# Patient Record
Sex: FEMALE | Race: WHITE | Employment: FULL TIME | URBAN - METROPOLITAN AREA
[De-identification: names, ages, dates, MRNs, and addresses within clinical notes are randomized per-mention and may not be internally consistent; named-entity substitution may affect disease eponyms.]

---

## 2020-02-13 ENCOUNTER — HOSPITAL ENCOUNTER (OUTPATIENT)
Dept: LAB | Age: 45
Discharge: HOME | End: 2020-02-13

## 2020-02-13 ENCOUNTER — HOSPITAL ENCOUNTER (OUTPATIENT)
Dept: PREADMISSION TESTING | Age: 45
Discharge: HOME | End: 2020-02-13
Payer: MEDICAID

## 2020-02-13 LAB
ATRIAL RATE: 83 BPM
CALCULATED P AXIS, ECG09: 53 DEGREES
CALCULATED R AXIS, ECG10: 61 DEGREES
CALCULATED T AXIS, ECG11: 31 DEGREES
DIAGNOSIS, 93000: NORMAL
P-R INTERVAL, ECG05: 116 MS
Q-T INTERVAL, ECG07: 386 MS
QRS DURATION, ECG06: 78 MS
QTC CALCULATION (BEZET), ECG08: 453 MS
VENTRICULAR RATE, ECG03: 83 BPM
XX-LABCORP SPECIMEN COL,LCBCF: NORMAL

## 2020-02-13 PROCEDURE — 99001 SPECIMEN HANDLING PT-LAB: CPT

## 2020-02-13 PROCEDURE — 93005 ELECTROCARDIOGRAM TRACING: CPT

## 2020-02-29 ENCOUNTER — ANESTHESIA EVENT (OUTPATIENT)
Dept: SURGERY | Age: 45
End: 2020-02-29
Payer: MEDICAID

## 2020-03-02 ENCOUNTER — ANESTHESIA (OUTPATIENT)
Dept: SURGERY | Age: 45
End: 2020-03-02
Payer: MEDICAID

## 2020-03-02 ENCOUNTER — HOSPITAL ENCOUNTER (OUTPATIENT)
Age: 45
Setting detail: OUTPATIENT SURGERY
Discharge: HOME | End: 2020-03-02
Attending: ORTHOPAEDIC SURGERY | Admitting: ORTHOPAEDIC SURGERY
Payer: MEDICAID

## 2020-03-02 VITALS
RESPIRATION RATE: 15 BRPM | TEMPERATURE: 97.7 F | SYSTOLIC BLOOD PRESSURE: 109 MMHG | HEIGHT: 66 IN | DIASTOLIC BLOOD PRESSURE: 78 MMHG | WEIGHT: 259.06 LBS | HEART RATE: 97 BPM | OXYGEN SATURATION: 96 % | BODY MASS INDEX: 41.63 KG/M2

## 2020-03-02 PROCEDURE — 76010000131 HC OR TIME 2 TO 2.5 HR: Performed by: ORTHOPAEDIC SURGERY

## 2020-03-02 PROCEDURE — 77030002933 HC SUT MCRYL J&J -A: Performed by: ORTHOPAEDIC SURGERY

## 2020-03-02 PROCEDURE — 74011250637 HC RX REV CODE- 250/637: Performed by: ANESTHESIOLOGY

## 2020-03-02 PROCEDURE — 77030006835 HC BLD SAW SAG STRY -B: Performed by: ORTHOPAEDIC SURGERY

## 2020-03-02 PROCEDURE — 74011250636 HC RX REV CODE- 250/636: Performed by: NURSE ANESTHETIST, CERTIFIED REGISTERED

## 2020-03-02 PROCEDURE — 77030006877 HC BLD SHV FLL RAD S&N -B: Performed by: ORTHOPAEDIC SURGERY

## 2020-03-02 PROCEDURE — 74011000250 HC RX REV CODE- 250: Performed by: NURSE ANESTHETIST, CERTIFIED REGISTERED

## 2020-03-02 PROCEDURE — 77030034531 HC BLD SHV FLL RAD ELITE S&N -B: Performed by: ORTHOPAEDIC SURGERY

## 2020-03-02 PROCEDURE — 74011250636 HC RX REV CODE- 250/636: Performed by: ORTHOPAEDIC SURGERY

## 2020-03-02 PROCEDURE — 77030020782 HC GWN BAIR PAWS FLX 3M -B: Performed by: ORTHOPAEDIC SURGERY

## 2020-03-02 PROCEDURE — 77030032834 HC GRFT BN SUB MUSC -F: Performed by: ORTHOPAEDIC SURGERY

## 2020-03-02 PROCEDURE — 74011250636 HC RX REV CODE- 250/636: Performed by: ANESTHESIOLOGY

## 2020-03-02 PROCEDURE — 76210000021 HC REC RM PH II 0.5 TO 1 HR: Performed by: ORTHOPAEDIC SURGERY

## 2020-03-02 PROCEDURE — 76060000035 HC ANESTHESIA 2 TO 2.5 HR: Performed by: ORTHOPAEDIC SURGERY

## 2020-03-02 PROCEDURE — 74011000250 HC RX REV CODE- 250: Performed by: ORTHOPAEDIC SURGERY

## 2020-03-02 PROCEDURE — C1713 ANCHOR/SCREW BN/BN,TIS/BN: HCPCS | Performed by: ORTHOPAEDIC SURGERY

## 2020-03-02 PROCEDURE — 77030018835 HC SOL IRR LR ICUM -A: Performed by: ORTHOPAEDIC SURGERY

## 2020-03-02 PROCEDURE — L1830 KO IMMOB CANVAS LONG PRE OTS: HCPCS | Performed by: ORTHOPAEDIC SURGERY

## 2020-03-02 PROCEDURE — 77030000032 HC CUF TRNQT ZIMM -B: Performed by: ORTHOPAEDIC SURGERY

## 2020-03-02 PROCEDURE — 77030018725 HC ELECTRD 90DEG DISP J&J -D: Performed by: ORTHOPAEDIC SURGERY

## 2020-03-02 PROCEDURE — 77030022877 HC TU IRR ARTHRO PMP ARTH -B: Performed by: ORTHOPAEDIC SURGERY

## 2020-03-02 PROCEDURE — 77030040361 HC SLV COMPR DVT MDII -B: Performed by: ORTHOPAEDIC SURGERY

## 2020-03-02 PROCEDURE — 76210000006 HC OR PH I REC 0.5 TO 1 HR: Performed by: ORTHOPAEDIC SURGERY

## 2020-03-02 PROCEDURE — 77030034478 HC TU IRR ARTHRO PT ARTH -B: Performed by: ORTHOPAEDIC SURGERY

## 2020-03-02 PROCEDURE — 77030002922 HC SUT FBRWRE ARTH -B: Performed by: ORTHOPAEDIC SURGERY

## 2020-03-02 PROCEDURE — C1769 GUIDE WIRE: HCPCS | Performed by: ORTHOPAEDIC SURGERY

## 2020-03-02 DEVICE — SCR INTRF BIOCOMP ADV 8X23MM -- MILAGRO BIOCRYL RAPIDE: Type: IMPLANTABLE DEVICE | Site: KNEE | Status: FUNCTIONAL

## 2020-03-02 DEVICE — GRAFT BNE SUB SM CANC FRZN MORSELIZED W/ VIABLE CELL: Type: IMPLANTABLE DEVICE | Site: KNEE | Status: FUNCTIONAL

## 2020-03-02 RX ORDER — GLYCOPYRROLATE 0.2 MG/ML
INJECTION INTRAMUSCULAR; INTRAVENOUS AS NEEDED
Status: DISCONTINUED | OUTPATIENT
Start: 2020-03-02 | End: 2020-03-02 | Stop reason: HOSPADM

## 2020-03-02 RX ORDER — HYDROMORPHONE HYDROCHLORIDE 2 MG/ML
0.5 INJECTION, SOLUTION INTRAMUSCULAR; INTRAVENOUS; SUBCUTANEOUS
Status: DISCONTINUED | OUTPATIENT
Start: 2020-03-02 | End: 2020-03-03 | Stop reason: HOSPADM

## 2020-03-02 RX ORDER — NALOXONE HYDROCHLORIDE 0.4 MG/ML
0.2 INJECTION, SOLUTION INTRAMUSCULAR; INTRAVENOUS; SUBCUTANEOUS AS NEEDED
Status: DISCONTINUED | OUTPATIENT
Start: 2020-03-02 | End: 2020-03-03 | Stop reason: HOSPADM

## 2020-03-02 RX ORDER — MIDAZOLAM HYDROCHLORIDE 1 MG/ML
INJECTION, SOLUTION INTRAMUSCULAR; INTRAVENOUS AS NEEDED
Status: DISCONTINUED | OUTPATIENT
Start: 2020-03-02 | End: 2020-03-02 | Stop reason: HOSPADM

## 2020-03-02 RX ORDER — OXYCODONE AND ACETAMINOPHEN 5; 325 MG/1; MG/1
1 TABLET ORAL AS NEEDED
Status: DISCONTINUED | OUTPATIENT
Start: 2020-03-02 | End: 2020-03-03 | Stop reason: HOSPADM

## 2020-03-02 RX ORDER — FENTANYL CITRATE 50 UG/ML
INJECTION, SOLUTION INTRAMUSCULAR; INTRAVENOUS AS NEEDED
Status: DISCONTINUED | OUTPATIENT
Start: 2020-03-02 | End: 2020-03-02 | Stop reason: HOSPADM

## 2020-03-02 RX ORDER — SODIUM CHLORIDE, SODIUM LACTATE, POTASSIUM CHLORIDE, CALCIUM CHLORIDE 600; 310; 30; 20 MG/100ML; MG/100ML; MG/100ML; MG/100ML
1000 INJECTION, SOLUTION INTRAVENOUS CONTINUOUS
Status: DISCONTINUED | OUTPATIENT
Start: 2020-03-02 | End: 2020-03-03 | Stop reason: HOSPADM

## 2020-03-02 RX ORDER — SCOLOPAMINE TRANSDERMAL SYSTEM 1 MG/1
1 PATCH, EXTENDED RELEASE TRANSDERMAL ONCE
Status: DISCONTINUED | OUTPATIENT
Start: 2020-03-02 | End: 2020-03-03 | Stop reason: HOSPADM

## 2020-03-02 RX ORDER — FLUMAZENIL 0.1 MG/ML
0.2 INJECTION INTRAVENOUS
Status: DISCONTINUED | OUTPATIENT
Start: 2020-03-02 | End: 2020-03-03 | Stop reason: HOSPADM

## 2020-03-02 RX ORDER — SODIUM CHLORIDE, SODIUM LACTATE, POTASSIUM CHLORIDE, CALCIUM CHLORIDE 600; 310; 30; 20 MG/100ML; MG/100ML; MG/100ML; MG/100ML
125 INJECTION, SOLUTION INTRAVENOUS CONTINUOUS
Status: DISCONTINUED | OUTPATIENT
Start: 2020-03-02 | End: 2020-03-03 | Stop reason: HOSPADM

## 2020-03-02 RX ORDER — ALBUTEROL SULFATE 0.83 MG/ML
2.5 SOLUTION RESPIRATORY (INHALATION) AS NEEDED
Status: DISCONTINUED | OUTPATIENT
Start: 2020-03-02 | End: 2020-03-03 | Stop reason: HOSPADM

## 2020-03-02 RX ORDER — FENTANYL CITRATE 50 UG/ML
25 INJECTION, SOLUTION INTRAMUSCULAR; INTRAVENOUS AS NEEDED
Status: DISCONTINUED | OUTPATIENT
Start: 2020-03-02 | End: 2020-03-03 | Stop reason: HOSPADM

## 2020-03-02 RX ORDER — DIPHENHYDRAMINE HYDROCHLORIDE 50 MG/ML
12.5 INJECTION, SOLUTION INTRAMUSCULAR; INTRAVENOUS
Status: DISCONTINUED | OUTPATIENT
Start: 2020-03-02 | End: 2020-03-03 | Stop reason: HOSPADM

## 2020-03-02 RX ORDER — PROPOFOL 10 MG/ML
INJECTION, EMULSION INTRAVENOUS AS NEEDED
Status: DISCONTINUED | OUTPATIENT
Start: 2020-03-02 | End: 2020-03-02 | Stop reason: HOSPADM

## 2020-03-02 RX ORDER — HYDROMORPHONE HYDROCHLORIDE 2 MG/ML
INJECTION, SOLUTION INTRAMUSCULAR; INTRAVENOUS; SUBCUTANEOUS AS NEEDED
Status: DISCONTINUED | OUTPATIENT
Start: 2020-03-02 | End: 2020-03-02 | Stop reason: HOSPADM

## 2020-03-02 RX ORDER — DEXAMETHASONE SODIUM PHOSPHATE 4 MG/ML
INJECTION, SOLUTION INTRA-ARTICULAR; INTRALESIONAL; INTRAMUSCULAR; INTRAVENOUS; SOFT TISSUE AS NEEDED
Status: DISCONTINUED | OUTPATIENT
Start: 2020-03-02 | End: 2020-03-02 | Stop reason: HOSPADM

## 2020-03-02 RX ORDER — LIDOCAINE HYDROCHLORIDE 20 MG/ML
INJECTION, SOLUTION EPIDURAL; INFILTRATION; INTRACAUDAL; PERINEURAL AS NEEDED
Status: DISCONTINUED | OUTPATIENT
Start: 2020-03-02 | End: 2020-03-02 | Stop reason: HOSPADM

## 2020-03-02 RX ORDER — CEFAZOLIN SODIUM 2 G/50ML
2 SOLUTION INTRAVENOUS ONCE
Status: COMPLETED | OUTPATIENT
Start: 2020-03-02 | End: 2020-03-02

## 2020-03-02 RX ORDER — ACETAMINOPHEN 500 MG
1000 TABLET ORAL
Status: COMPLETED | OUTPATIENT
Start: 2020-03-02 | End: 2020-03-02

## 2020-03-02 RX ORDER — ONDANSETRON 2 MG/ML
INJECTION INTRAMUSCULAR; INTRAVENOUS AS NEEDED
Status: DISCONTINUED | OUTPATIENT
Start: 2020-03-02 | End: 2020-03-02 | Stop reason: HOSPADM

## 2020-03-02 RX ADMIN — MIDAZOLAM 2 MG: 1 INJECTION INTRAMUSCULAR; INTRAVENOUS at 16:52

## 2020-03-02 RX ADMIN — FENTANYL CITRATE 25 MCG: 50 INJECTION, SOLUTION INTRAMUSCULAR; INTRAVENOUS at 18:21

## 2020-03-02 RX ADMIN — ACETAMINOPHEN 1000 MG: 500 TABLET ORAL at 12:09

## 2020-03-02 RX ADMIN — FENTANYL CITRATE 25 MCG: 50 INJECTION, SOLUTION INTRAMUSCULAR; INTRAVENOUS at 17:38

## 2020-03-02 RX ADMIN — ONDANSETRON HYDROCHLORIDE 4 MG: 2 INJECTION INTRAMUSCULAR; INTRAVENOUS at 16:55

## 2020-03-02 RX ADMIN — SODIUM CHLORIDE, SODIUM LACTATE, POTASSIUM CHLORIDE, AND CALCIUM CHLORIDE 125 ML/HR: 600; 310; 30; 20 INJECTION, SOLUTION INTRAVENOUS at 15:53

## 2020-03-02 RX ADMIN — FENTANYL CITRATE 25 MCG: 50 INJECTION, SOLUTION INTRAMUSCULAR; INTRAVENOUS at 17:51

## 2020-03-02 RX ADMIN — LIDOCAINE HYDROCHLORIDE 100 MG: 20 INJECTION, SOLUTION INTRAVENOUS at 16:59

## 2020-03-02 RX ADMIN — CEFAZOLIN SODIUM 1 G: 2 SOLUTION INTRAVENOUS at 17:22

## 2020-03-02 RX ADMIN — FENTANYL CITRATE 25 MCG: 50 INJECTION, SOLUTION INTRAMUSCULAR; INTRAVENOUS at 17:17

## 2020-03-02 RX ADMIN — SODIUM CHLORIDE, SODIUM LACTATE, POTASSIUM CHLORIDE, AND CALCIUM CHLORIDE 125 ML/HR: 600; 310; 30; 20 INJECTION, SOLUTION INTRAVENOUS at 11:59

## 2020-03-02 RX ADMIN — FENTANYL CITRATE 25 MCG: 50 INJECTION, SOLUTION INTRAMUSCULAR; INTRAVENOUS at 18:30

## 2020-03-02 RX ADMIN — FENTANYL CITRATE 25 MCG: 50 INJECTION, SOLUTION INTRAMUSCULAR; INTRAVENOUS at 18:00

## 2020-03-02 RX ADMIN — DEXAMETHASONE SODIUM PHOSPHATE 4 MG: 4 INJECTION, SOLUTION INTRAMUSCULAR; INTRAVENOUS at 18:58

## 2020-03-02 RX ADMIN — FENTANYL CITRATE 25 MCG: 50 INJECTION, SOLUTION INTRAMUSCULAR; INTRAVENOUS at 18:36

## 2020-03-02 RX ADMIN — HYDROMORPHONE HYDROCHLORIDE 0.5 MG: 2 INJECTION, SOLUTION INTRAMUSCULAR; INTRAVENOUS; SUBCUTANEOUS at 19:36

## 2020-03-02 RX ADMIN — FENTANYL CITRATE 50 MCG: 50 INJECTION, SOLUTION INTRAMUSCULAR; INTRAVENOUS at 16:59

## 2020-03-02 RX ADMIN — PROPOFOL 200 MG: 10 INJECTION, EMULSION INTRAVENOUS at 16:59

## 2020-03-02 RX ADMIN — CEFAZOLIN SODIUM 2 G: 2 SOLUTION INTRAVENOUS at 17:03

## 2020-03-02 RX ADMIN — HYDROMORPHONE HYDROCHLORIDE 0.5 MG: 2 INJECTION, SOLUTION INTRAMUSCULAR; INTRAVENOUS; SUBCUTANEOUS at 19:25

## 2020-03-02 RX ADMIN — HYDROMORPHONE HYDROCHLORIDE 0.5 MG: 2 INJECTION, SOLUTION INTRAMUSCULAR; INTRAVENOUS; SUBCUTANEOUS at 19:46

## 2020-03-02 RX ADMIN — FENTANYL CITRATE 25 MCG: 50 INJECTION, SOLUTION INTRAMUSCULAR; INTRAVENOUS at 17:09

## 2020-03-02 RX ADMIN — FENTANYL CITRATE 25 MCG: 50 INJECTION, SOLUTION INTRAMUSCULAR; INTRAVENOUS at 17:31

## 2020-03-02 RX ADMIN — FENTANYL CITRATE 25 MCG: 50 INJECTION, SOLUTION INTRAMUSCULAR; INTRAVENOUS at 17:26

## 2020-03-02 RX ADMIN — HYDROMORPHONE HYDROCHLORIDE 1 MG: 2 INJECTION, SOLUTION INTRAMUSCULAR; INTRAVENOUS; SUBCUTANEOUS at 18:37

## 2020-03-02 RX ADMIN — GLYCOPYRROLATE 0.2 MG: 0.2 INJECTION INTRAMUSCULAR; INTRAVENOUS at 16:55

## 2020-03-02 RX ADMIN — HYDROMORPHONE HYDROCHLORIDE 0.5 MG: 2 INJECTION, SOLUTION INTRAMUSCULAR; INTRAVENOUS; SUBCUTANEOUS at 20:06

## 2020-03-02 NOTE — ANESTHESIA PREPROCEDURE EVALUATION
Relevant Problems   No relevant active problems       Anesthetic History     PONV          Review of Systems / Medical History  Patient summary reviewed, nursing notes reviewed and pertinent labs reviewed    Pulmonary  Within defined limits                 Neuro/Psych   Within defined limits           Cardiovascular                  Exercise tolerance: >4 METS     GI/Hepatic/Renal     GERD      PUD     Endo/Other        Morbid obesity and arthritis     Other Findings              Physical Exam    Airway  Mallampati: II  TM Distance: 4 - 6 cm  Neck ROM: normal range of motion   Mouth opening: Normal     Cardiovascular  Regular rate and rhythm,  S1 and S2 normal,  no murmur, click, rub, or gallop             Dental  No notable dental hx       Pulmonary  Breath sounds clear to auscultation               Abdominal  GI exam deferred       Other Findings            Anesthetic Plan    ASA: 2  Anesthesia type: general          Induction: Intravenous  Anesthetic plan and risks discussed with: Patient

## 2020-03-02 NOTE — INTERVAL H&P NOTE
H&P Update: 
Willie Bonilla was seen and examined. History and physical has been reviewed. The patient has been examined. There have been no significant clinical changes since the completion of the originally dated History and Physical. 
Patient identified by surgeon; surgical site was confirmed by patient and surgeon.

## 2020-03-02 NOTE — PERIOP NOTES
Reviewed PTA medication list with patient/caregiver and patient/caregiver denies any additional medications. Patient admits to having a responsible adult care for them for at least 24 hours after surgery.     Dual skin assessment completed by Colin ARGUETA and Jc Wright RN.

## 2020-03-02 NOTE — H&P
[address] [facility] [address]  History and Physical Exam    Patient: Brigitte Bailey MRN: [id number]  SSN: [id number]    YOB: 1975  Age: 40 y.o. Sex: female      Subjective:      Chief Complaint: left knee pain    History of Present Illness:  Patient complains of left knee pain and instability    Past Medical History:   Diagnosis Date    Adverse effect of anesthesia     causes blood pressure to go up    Arthritis     GERD (gastroesophageal reflux disease)     Nausea & vomiting     PUD (peptic ulcer disease)     h/o     Past Surgical History:   Procedure Laterality Date    HX  SECTION      HX COLECTOMY      HX HERNIA REPAIR      HX HYSTERECTOMY  2013    HX LAP CHOLECYSTECTOMY  2014    HX TUBAL LIGATION  2004     Social History     Occupational History    Not on file   Tobacco Use    Smoking status: Never Smoker    Smokeless tobacco: Never Used   Substance and Sexual Activity    Alcohol use: Not Currently    Drug use: Not Currently    Sexual activity: Not on file     Prior to Admission medications    Medication Sig Start Date End Date Taking? Authorizing Provider   esomeprazole (NEXIUM) 40 mg capsule Take 40 mg by mouth two (2) times a day. Provider, Historical   ondansetron hcl (ZOFRAN) 4 mg tablet Take 4 mg by mouth every eight (8) hours as needed for Nausea or Vomiting. Provider, Historical   cyclobenzaprine (FLEXERIL) 10 mg tablet Take 10 mg by mouth three (3) times daily as needed for Muscle Spasm(s). Provider, Historical   lubiPROStone (AMITIZA) 8 mcg capsule Take 8 mcg by mouth daily as needed for Constipation. Provider, Historical   acetaminophen (TYLENOL) 325 mg tablet Take 325 mg by mouth every four (4) hours as needed for Pain. Provider, Historical       Allergies:    Allergies   Allergen Reactions    Iodinated Contrast Media Anaphylaxis    Nsaids (Non-Steroidal Anti-Inflammatory Drug) Diarrhea and Nausea and Vomiting      Family History: Diabetes mellitus II, arthritis, hypertension    Review of Systems:  A comprehensive review of systems was negative except for that written in the History of Present Illness. Objective:       Physical Exam:  HEENT: Normocephalic, atraumatic  Lungs:  Clear to auscultation  Heart:   Regular rate and rhythm  Abdomen: Soft  Extremities:  Left knee lachman's positive  Neurological: Grossly neurovascularly intact    Assessment:      Left knee ACL rupture. Plan:       The patient has failed previous efforts of conservative management to include bracing. Due to the fact that conservative efforts failed, the patient became a candidate for surgical intervention. Proceed with scheduled left knee arthroscopy with ACL reconstruction. The various methods of treatment have been discussed with the patient and family. After consideration of risks, benefits, and other options for treatment, the patient has consented to surgical interventions. Questions were answered and preoperative teaching was done by Dr. Fletcher Zarate.      Signed By: Maria D Hanks PA-C     March 1, 2020

## 2020-03-03 NOTE — OP NOTES
[facility]  OPERATIVE REPORT    Name:  Brandi Uribe  MR#:   [id number]  :  1975  ACCOUNT #:  [de-identified]  DATE OF SERVICE:  2020    PREOPERATIVE DIAGNOSES:  1. Anterior cruciate ligament tear, left knee. 2.  Medial and lateral meniscus tear, left knee    POSTOPERATIVE DIAGNOSES:  1. Anterior cruciate ligament, left knee. 2.  Medial and lateral meniscus tear, left knee. 3.  Chondromalacia. PROCEDURES PERFORMED:  Left knee arthroscopy, partial medial and lateral meniscectomy, anterior cruciate ligament reconstruction with allograft, chondroplasty. SURGEON:  Radha France M.D.    ASSISTANT:  Mateo Alvarado PA-C MS    ANESTHESIA:  General.    ANESTHESIOLOGIST:  Han Wagoner MD    COMPLICATIONS:  none    SPECIMENS REMOVED:  none    IMPLANTS:  Cross pins, tibial screw, BTB allograph    ESTIMATED BLOOD LOSS:  15 mL. PROCEDURE:  The patient was taken to the operating room and placed in supine position on the operating room table. After satisfactory general anesthesia was established, tourniquet was placed on the left thigh. Right leg was placed in the Yellofin device. Exam under anesthesia was done, which showed positive Lachman's testing and positive pivot shift testing. The leg was prepped with ChloraPrep and draped in sterile fashion. Outlining the patella. Patellar tendon and joint line had been marked in the preoperative hold area, was still visible and remarked at this time. Portals were determined, needle localized. Anterolateral portal was made 1 cm proximal to the joint line, 1 cm lateral to the patellar tendon. The arthroscope was inserted, patellofemoral joint was evaluated. There was noted to be mild chondromalacia of the posterior aspect of the patella; however, chondroplasty was not required. An anteromedial portal was produced 1 cm proximal to the joint line, 1 cm medial to the patellar tendon. The portal was dilated and the 3.5 shaver was inserted. There was some synovitis in the patellofemoral joint, which was resected, and this was extended into the medial and lateral joint line, it was resected in these areas as well. The intercondylar notch was evaluated, the anterior cruciate ligament was 99% absent. There was one small strand of ACL fiber, which was nonfunctional.  The PCL was in good condition. Progression was made into the medial joint compartment. There was an obvious longstanding medial meniscus tear, with a portion of the medial meniscus which projected into the joint itself. It was approximately 2 cm in length, it was rounded and attached at the medial joint line to the remainder of the medial meniscus. This was morselized using an upbiting basket and removed. The posterior portion of the medial meniscus did have some small tears, and these were trimmed, smoothed, and balanced. Progression was made into the lateral joint compartment. There was noted to be a tear in the posterior horn of the lateral meniscus, which was trimmed using the shaver as well. There was noted to be a significant area of delaminated cartilage on the weight-bearing surface of the medial femoral condyle. These delaminated portions of cartilage were resected all well fixed cartilage was allowed to remain in place. The full-radius resector was used to do a notchplasty to determine the over-the-top position and determine the over-the-top position. After this was done, the medial incision was initially made approximately 1 inch in length, but after trying to put the guide in position, the skin and underlying adipose tissue deflected the guide such that it made putting the pin through the bone unpredictable, and with the size of her thigh, I could not afford to come out more proximal than desired, if this would give me problems with her tourniquet.   Therefore, the incision was extended to approximately 2.5 inches to allow the guide to come closer to the bone, for best pin placement. After this was accomplished, the guide was placed into the knee, and a guidewire was drilled across the femur. This was then reamed. The 6-mm over-the-top position was placed. The over-the-top position was confirmed using an angled curette. The 6-mm guide was placed. The guidewire was drilled across the femur and out the anterior thigh. The DePuy Mitek guide was placed in the femoral socket and the two cross pins were drilled and their position was confirmed in the knee. The graft was then pulled into position. The proximal portion of the graft was drilled and the cross pins were placed. After that, the distal graft was pulled, the patient was able to be shaken on the table by pulling this fully. The graft was checked by isometry and was noted to be quite isometric, with minimal change on full extension. The knee was placed in full extension, tension was placed on the graft, the 8 x 23 mm DePuy Mitek JOSEPH screw was tapped and inserted, with excellent squeak and excellent stability. Lachman's testing was repeated and noted to be negative. The incision was closed using 2-0 and 3-0 Monocryl, followed by Mastisol, Steri-Strips, Xeroform, 4 x 4's, ABD, soft roll. The patient was placed in a knee immobilizer and asked to remain 15% weightbearing on the left leg. The patient tolerated the procedure well and transferred onto her recovery bed, and taken to recovery room in stable condition.       MD JENNY Shoemaker/[email or web address]  D:  03/02/2020 19:34  T:  03/03/2020 6:09  JOB #:  [id number]

## 2020-03-03 NOTE — DISCHARGE INSTRUCTIONS
Post operative Instructions for Knee Arthroscopy with ACL Reconstruction  1. You may remove the surgical dressing 2 days after surgery. It is okay to shower and get the incision wet at this time, but no soaking or bathing. Steri strips may be removed if necessary. You can place band-aids over the incisions and apply an Ace wrap if necessary. 2. Wear knee immobilizer at all times except to shower. You may weight-bear 10% on the operative leg with crutches. 3. Elevate the operative extremity and apply ice as often as possible to help reduce swelling. This also increases blood flow to help improve healing. 4. All patients should begin straight leg raises the day of surgery, approximately 30 every hour. 5. It is important to get up and walk around for 3-5 minutes every hour while you are awake. This will decrease the risk of blood clot. 6. If you develop fever of greater than 101.4 or chills, please contact our office immediately, [phone].   7. Take the pain medication as prescribed. Maximum dose of Acetaminophen in a 24 hr period is 4 grams. Larger doses can cause liver impairment. Be aware that over the counter products can contain acetaminophen. 8. Follow up in 7-10 days. Call to schedule appointment. [phone]. DISCHARGE SUMMARY from Nurse    PATIENT INSTRUCTIONS:    Increase fluid intake today. No driving today, or while taking narcotics. Keep knee immobilizer on as instructed. After general anesthesia or intravenous sedation, for 24 hours or while taking prescription Narcotics:  · Limit your activities  · Do not drive and operate hazardous machinery  · Do not make important personal or business decisions  · Do  not drink alcoholic beverages  · If you have not urinated within 8 hours after discharge, please contact your surgeon on call.     Report the following to your surgeon:  · Excessive pain, swelling, redness or odor of or around the surgical area  · Temperature over 100.5  · Nausea and vomiting lasting longer than 4 hours or if unable to take medications  · Any signs of decreased circulation or nerve impairment to extremity: change in color, persistent  numbness, tingling, coldness or increase pain  · Any questions    What to do at Home:  Recommended activity: Activity as tolerated and no driving for today. If you experience any of the following symptoms as noted above, please follow up with Dr. Kiera Fortune. *  Please give a list of your current medications to your Primary Care Provider. *  Please update this list whenever your medications are discontinued, doses are      changed, or new medications (including over-the-counter products) are added. *  Please carry medication information at all times in case of emergency situations. These are general instructions for a healthy lifestyle:    No smoking/ No tobacco products/ Avoid exposure to second hand smoke  Surgeon General's Warning:  Quitting smoking now greatly reduces serious risk to your health. Obesity, smoking, and sedentary lifestyle greatly increases your risk for illness    A healthy diet, regular physical exercise & weight monitoring are important for maintaining a healthy lifestyle    You may be retaining fluid if you have a history of heart failure or if you experience any of the following symptoms:  Weight gain of 3 pounds or more overnight or 5 pounds in a week, increased swelling in our hands or feet or shortness of breath while lying flat in bed. Please call your doctor as soon as you notice any of these symptoms; do not wait until your next office visit. The discharge information has been reviewed with the patient and caregiver. The patient and caregiver verbalized understanding. Discharge medications reviewed with the patient and caregiver and appropriate educational materials and side effects teaching were provided.     Patient armband removed and shredded  ___________________________________________________________________________________________________________________________________

## 2020-03-03 NOTE — BRIEF OP NOTE
BRIEF OPERATIVE NOTE    Date of Procedure: 3/2/2020   Preoperative Diagnosis: SPRAIN OF ANTERIOR CRUCIATE LIGAMENT OF LEFT KNEE, INITIAL ENCOUNTER, OTHER TEAR OF MEDIAL MENISCUS CURRENT INJURY, LEFT KNEE INITIAL ENCOUNTER, OTHER  Postoperative Diagnosis: SPRAIN OF ANTERIOR CRUCIATE LIGAMENT OF LEFT KNEE, INITIAL ENCOUNTER, OTHER TEAR OF MEDIAL MENISCUS CURRENT INJURY, LEFT KNEE INITIAL ENCOUNTER, OTHER    Procedure(s):  LEFT KNEE ARTHROSCOPY WITH ANTERIOR CRUCIATE LIGAMENT RECONSTRUCTION WITH ALLOGRAFT, PARTIAL LATERAL AND MEDIAL MENISCECTOMY, CHONDROPLASTY  Surgeon(s) and Role: Royal Dmitriy MD - Primary         Surgical Assistant: Modesta Mason PA-C    Surgical Staff:  Circ-1: Camilla Davidson RN  Physician Assistant: Toma Venegas PA-C  Scrub Tech-1: Michael Mantilla  Scrub Tech-2: Chel YBARRA  Event Time In Time Out   Incision Start 1725    Incision Close 1904      Anesthesia: General   Estimated Blood Loss: 15mL  Specimens: * No specimens in log *   Findings: ACL rupture, MMT, LMT, chondromalacia   Complications: None  Implants:   Implant Name Type Inv.  Item Serial No.  Lot No. LRB No. Used Action   RIGIDFIX BIOCRYL BTB CROSS PIN KIT  2.7MM     DEPUY MITEK [id number] Left 1 Implanted   GRAFT BNE EL[employer]USTO  --  - [id number]  GRAFT BNE EL[employer]USTO  --  [id number] MUSCULOSKELETAL TRANS  Left 1 Implanted   SCR INTRF BIOCOMP ADV 8X23MM -- JOSEPH BIOCRYL RAPIDE - [id number]  SCR INTRF BIOCOMP ADV 8X23MM -- JOSEPH BIOCRYL RAPIDE  [employer] DEPUY MITEK [id number] Left 1 Implanted

## 2020-03-03 NOTE — ANESTHESIA POSTPROCEDURE EVALUATION
Procedure(s):  LEFT KNEE ARTHROSCOPY WITH ANTERIOR CRUCIATE LIGAMENT RECONSTRUCTION WITH ALLOGRAFT, PARTIAL LATERAL AND MEDIAL MENISCECTOMY, CHONDROPLASTY. general    Anesthesia Post Evaluation      Multimodal analgesia: multimodal analgesia used between 6 hours prior to anesthesia start to PACU discharge  Patient location during evaluation: PACU  Patient participation: complete - patient participated  Level of consciousness: awake  Pain management: adequate  Airway patency: patent  Anesthetic complications: no  Cardiovascular status: acceptable  Respiratory status: acceptable  Hydration status: acceptable  Post anesthesia nausea and vomiting:  none      Vitals Value Taken Time   /81 3/2/2020  7:50 PM   Temp 36.5 °C (97.7 °F) 3/2/2020  7:19 PM   Pulse 97 3/2/2020  7:55 PM   Resp 18 3/2/2020  7:55 PM   SpO2 100 % 3/2/2020  7:55 PM   Vitals shown include unvalidated device data.

## 2020-03-03 NOTE — PERIOP NOTES
I have reviewed discharge instructions with the patient and caregiver. The patient and caregiver verbalized understanding. electronic signature pad not working.

## 2021-07-27 ENCOUNTER — HOSPITAL ENCOUNTER (OUTPATIENT)
Dept: PHYSICAL THERAPY | Age: 46
Discharge: HOME | End: 2021-07-27
Payer: MEDICAID

## 2021-07-27 PROCEDURE — 97162 PT EVAL MOD COMPLEX 30 MIN: CPT

## 2021-07-27 NOTE — PROGRESS NOTES
[facility]  [address], [address] [facility]  Ph [phone]  Fx [phone]    Plan of Care/ Statement of Necessity for Physical Therapy Services    Patient name: Saima Quispe Start of Care: 2021   Referral source: Justin Batista MD : 1975    Medical Diagnosis: Left knee pain [M25.562]   Onset Date:2020    Treatment Diagnosis: left knee pain                                              ICD-10: M25.562   Prior Hospitalization: see medical history Provider#: [id number]   Medications: Verified on Patient summary List   Comorbidities/PMHx/Surgical Hx: left knee surgery ACL repair and meniscal and MCL in 2020. Stomach-hernia repair surgery , , gallbladder removal, hernia repair with obstruction. [x]? Other-arthritis, IBS. Prior level of function: functionally independent, no AD, moderately active lifestyle, she could work 12-13 hour days. The Plan of Care and following information is based on the information from the initial evaluation. Assessment/ key information: Patient is a 40 yo female who [facility] with c/o left knee pain. Patient is s/p left ACL, MCL and meniscal reconstruction on 2020. Patient's surgery appears to be intact. Patient s/s are consistent with left knee pain due to patient not fully rehabbing from left knee surgery last March. Patient reports pain is a 4/10 at best with ice and elvation; 10/10 at worst with stairs, weather and walking too much. Patient demonstrates decreased ROM, decreased strength, left knee swelling, impaired gait and pain which limits ease with functional activities and mobility. Patient would benefit from skilled physical therapy to address the above limitations.        Evaluation Complexity History MEDIUM  Complexity : 1-2 comorbidities / personal factors will impact the outcome/ POC ; Examination MEDIUM Complexity : 3 Standardized tests and measures addressing body structure, function, activity limitation and / or participation in recreation  ;Presentation MEDIUM Complexity : Evolving with changing characteristics  ; Clinical Decision Making MEDIUM Complexity : FOTO score of 26-74  Overall Complexity Rating: MEDIUM  Problem List: pain affecting function, decrease ROM, decrease strength, edema affecting function, impaired gait/ balance, decrease ADL/ functional abilitiies, decrease activity tolerance, decrease flexibility/ joint mobility and decrease transfer abilities   Treatment Plan may include any combination of the following: Therapeutic exercise, Therapeutic activities, Neuromuscular re-education, Physical agent/modality, Gait/balance training, Manual therapy, Patient education, Self Care training, Functional mobility training, Home safety training and Stair training  Patient / Family readiness to learn indicated by: asking questions, trying to perform skills and interest  Persons(s) to be included in education: patient (P)  Barriers to Learning/Limitations: None  Patient Goal (s): knee to be stronger, increase stability, walk without a limp, no stiffness when I stand up and be able to walk fast  Patient Self Reported Health Status: good  Rehabilitation Potential: good         Progress towards goals / Updated goals:  Short Term Goals: To be accomplished in 2 weeks: 1. Patient will report compliance with HEP at least 1x/day to aid in rehabilitation program.   Status at IE: patient was given HEP. Current: Same as IE     2. Patient will demonstrate left knee flexion AROM of at least 120 degrees to aid in completion of ADLs. Status at IE:115 degrees. Current: Same as IE       Long Term Goals: To be accomplished in 4 weeks: 1. Patient will increase strength by at least . 5 grade MMT throughout B LEs to aid in completion of ADLs. Status at [address] [place] [address] (MMT):5/5 with exceptions indicated below.                                              Hip Left (1-5) Right (1-5)   Hip Flexion 3+ quad lag 4   Hip Extension 4-    Hip ABD 3+ 4-   Hip ADD nt nt   Hip ER 3+    Hip IR 4-      Knee Left(1-5) Right (1-5)   Knee Flexion 4    Knee Extension 3 (not full range)    Ankle PF 3+ 4   Ankle DF     Other        Current: Same as IE     2. Patient will report pain less than 2/10 on average and no more than a 5/10 at worst to aid in completion of ADLs. Status at IE:best pain is a 4/10, worst pain is a 10/10   Current: Same as IE    3. Patient will have at least . 5 cm decrease in left knee swelling to improve left knee ROM to increase ease with functional activities. Status at IE:Swelling (cm): 2 inches below mid patella: Right:42 Left:43  Mid patella: Right:50 Left: 50.5  2 inches above mid patella: Right:54.5 Left:55      Current: Same as IE    4. Patient will improve FOTO to 55 points overall to demonstrate improvement in functional ability. Status at IE:40   Current: Same as IE     5. Patient will demonstrate left knee extension ROM to at least 0 degrees in order to normalize gait pattern. Status at IE: lacking 15 degrees of left knee extension. Pt with antalgic gait pattern. 6. Pt will improve left single leg stance to at least 5 seconds to improve stability with stair negotiation. Status at IE: left single leg stance < 1 sec. 7. Pt will demonstrate left single leg squat without UE support at least 15 times in order to improve stability with stair negotiation. Status at IE: pt is unable to perform a left single leg squat even with bilateral UE support. *FOTO score is an established functional score where 100 = no disability*    Frequency / Duration: Patient to be seen 2 times per week for 6 weeks. Patient/ Caregiver education and instruction: Diagnosis, prognosis, exercises and other gait.     [x]  Plan of care has been reviewed with PTA    Certification Period: ADDI Oliveira, PT 7/27/2021 11:33 AM    ________________________________________________________________________    I certify that the above Therapy Services are being furnished while the patient is under my care. I agree with the treatment plan and certify that this therapy is necessary.     Physician's Signature:_____________________Date:____________TIME:________                                      Wilbert Jacobson MD      ** Signature, Date and Time must be completed for valid certification **  Please sign and return to [facility] at [facility]  [address], [address]  Ph [phone]  Fx [phone]

## 2021-07-27 NOTE — PROGRESS NOTES
PT DAILY TREATMENT NOTE/Hip/Knee/Ankle EVAL 10-18    Patient Name: Bindu Mccurdy  Date:2021  : 1975  [x]  Patient  Verified  Payor: BLUE CROSS MEDICAID / Plan: [facility] HEALTHKEEPERS PLUS / Product Type: Managed Care Medicaid /    In time:10:20  Out time:11:06  Total Treatment Time (min): 55  Visit #: 1 of 12    Treatment Area: Left knee pain [M25.562]      SUBJECTIVE  Pain Level (0-10 scale): 7/10  [x]constant []intermittent []improving [x]worsening []no change since onset  Any medication changes, allergies to medications, adverse drug reactions, diagnosis change, or new procedure performed?: [x] No    [] Yes (see summary sheet for update)  Subjective functional status/changes:      HPI: Pt reports she hurt her left knee when she was 16, she went to jump the [place] and she couldn't stand on it. Pt reports her left knee just swelled up. Pt reports they wanted to do surgery because she tore her ACL. Pt reports then she tore her MCL and her meniscus. Pt reports she had surgery in 2020. Then she had stomach surgery so she stopped physical therapy and then she had to take care of her mom because she had stage 4 breast CA. Pt reports she is still having pain. Pt reports she is still having swelling. Pain description:    [x]Constant []Intermittent [x]Achy [x]Sharp [x]Dull  [x]tingling- some days it will go down to her foot (when her leg is up and elvated). Limitations to PLOF: difficulty with walking up and down the stairs, her knee doesn't feel stable. Pt reports she can't carry anything heavy. Unable to go grocery shopping she has to use a cart to ride. Unable to carry groceries up to her house. Unable to work 12-13 hour days she had to change her jobs. Pt reports she had to change her jobs after the surgery. Mechanism of Injury: old injury.    Current symptoms/Complaints: 4/10 at best with ice and elvation; 10/10 at worst with stairs, weather and walking too much; pt reports they are unable to sleep through the night secondary to pain  Pain since onset: []Better [x]worse  Previous Treatment/Compliance: yes she went to PT after the surgery, but she didn't go for long because she had to have surgery on her stomach. Comorbidities/PMHx/Surgical Hx: left knee surgery ACL repair and meniscal and MCL in 2020. Stomach-hernia repair surgery , , gallbladder removal, hernia repair with obstruction. [x]Other-arthritis, IBS. Prior level of function: functionally independent, no AD, moderately active lifestyle, she could work 12-13 hour days. 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 30 + hours. Pt reports she can't sit for a long time her knees feel very stiff. Living Situation: lives with her daughter and her grandson able to help her with some things. Apartment doesn't have any stairs on the inside but she has to walk up a flight of stairs to get to it about 11-12 stairs. And half-way she has to stop. There is a railing and she has to hold on to it, it is on the left side going up. Pt Goals: \"knee to be stronger, increase stability, walk without a limp, no stiffness when I stand up and be able to walk fast.\"  Barriers: []pain []financial []time []transportation []other  Motivation: very motivated. Substance use: []Alcohol []Tobacco []other:   Cognition: A & O x 3        OBJECTIVE      40 min [x]Eval                  []Re-Eval       6 no charge min Therapeutic Exercise:  [] See flow sheet :   Rationale: increase ROM, increase strength and increase proprioception to improve the patients ability to increase patient's ease with performing functional activities and decrease overall pain and symptoms.             With   [x] TE   [] TA   [] neuro   [] other: Patient Education: [x] Review HEP    [] Progressed/Changed HEP based on:   [] positioning   [] body mechanics   [] transfers   [] heat/ice application    [x] other: pt was educated on gait to perform heel toe gait pattern with the left LE. General Evaluation    Gait: Pt ambulates with antalgic gait pattern with left knee flexion throughout stance phase, flat foot left initial contact, trendelenburg gait. Stairs:nt  Posture: decreased weight shift to left LE, left LE slight flexion when standing. Palpation/Sensation: pt with increased tenderness over left medial joint line left distal patella tendon. Increased tenderness over Distal quad and sartorius and distal hamstring tendons. Pt with limited left patellar mobility in all directions. Intact to light touch in bilateral LE's    ROM: (degrees)                                       AROM    PROM   Knee Left Right Left Right   Extension Lacking 15 3 hyper Lacking 12    Flexion 115 120        Strength (MMT):5/5 with exceptions indicated below. Hip Left (1-5) Right (1-5)   Hip Flexion 3+ quad lag 4   Hip Extension 4-    Hip ABD 3+ 4-   Hip ADD nt nt   Hip ER 3+    Hip IR 4-      Knee Left(1-5) Right (1-5)   Knee Flexion 4    Knee Extension 3 (not full range)    Ankle PF 3+ 4   Ankle DF     Other       Functional Mobility      Bed Mobility:      Scooting:        Rolling:       Sit-Supine:      Transfers:       Sit-Stand:modified independence. Special Tests:    Knee  Left Right   Varus Stress Test - -   Valgus Stress Test - -   Lachman's - -   Apprehension - -   Chiquis's nt nt   Apley's test nt    Single Leg Squat Unable to do Okay form with UE support. Posterior drawer - -   Patellar Grind uncomfortable -       Other Tests / Comments:    Single leg stance: L: <1 sec, R: 6 seconds.    Swelling (cm): 2 inches below mid patella: Right:42 Left:43  Mid patella: Right:50 Left: 50.5  2 inches above mid patella: Right:54.5 Left:55       Pain Level (0-10 scale) post treatment: 7/10    ASSESSMENT/Changes in Function: Patient is a 38 yo female who presents to [facility] with c/o left knee pain. Patient is s/p left ACL, MCL and meniscal reconstruction on March 2020. Patient's surgery appears to be intact. Patient s/s are consistent with left knee pain due to patient not fully rehabbing from left knee surgery last March. Patient reports pain is a 4/10 at best with ice and elvation; 10/10 at worst with stairs, weather and walking too much. Patient demonstrates decreased ROM, decreased strength, left knee swelling, impaired gait and pain which limits ease with functional activities and mobility. Patient would benefit from skilled physical therapy to address the above limitations. Patient will continue to benefit from skilled PT services to modify and progress therapeutic interventions, address functional mobility deficits, address ROM deficits, address strength deficits, analyze and address soft tissue restrictions, analyze and cue movement patterns, analyze and modify body mechanics/ergonomics and assess and modify postural abnormalities to attain remaining goals. [x]  See Plan of Care  []  See progress note/recertification  []  See Discharge Summary    Evaluation Complexity History MEDIUM  Complexity : 1-2 comorbidities / personal factors will impact the outcome/ POC ; Examination MEDIUM Complexity : 3 Standardized tests and measures addressing body structure, function, activity limitation and / or participation in recreation  ;Presentation MEDIUM Complexity : Evolving with changing characteristics  ; Clinical Decision Making MEDIUM Complexity : FOTO score of 26-74  Overall Complexity Rating: MEDIUM  Problem List: pain affecting function, decrease ROM, decrease strength, edema affecting function, impaired gait/ balance, decrease ADL/ functional abilitiies, decrease activity tolerance, decrease flexibility/ joint mobility and decrease transfer abilities   Treatment Plan may include any combination of the following: Therapeutic exercise, Therapeutic activities, Neuromuscular re-education, Physical agent/modality, Gait/balance training, Manual therapy, Patient education, Self Care training, Functional mobility training, Home safety training and Stair training  Patient / Family readiness to learn indicated by: asking questions, trying to perform skills and interest  Persons(s) to be included in education: patient (P)  Barriers to Learning/Limitations: None  Patient Goal (s): knee to be stronger, increase stability, walk without a limp, no stiffness when I stand up and be able to walk fast  Patient Self Reported Health Status: good  Rehabilitation Potential: good         Progress towards goals / Updated goals:  Short Term Goals: To be accomplished in 2 weeks: 1. Patient will report compliance with HEP at least 1x/day to aid in rehabilitation program.   Status at IE: patient was given HEP. Current: Same as IE     2. Patient will demonstrate left knee flexion AROM of at least 120 degrees to aid in completion of ADLs. Status at IE:115 degrees. Current: Same as IE       Long Term Goals: To be accomplished in 4 weeks: 1. Patient will increase strength by at least . 5 grade MMT throughout B LEs to aid in completion of ADLs. Status at [address] [place] [address] (MMT):5/5 with exceptions indicated below. Hip Left (1-5) Right (1-5)   Hip Flexion 3+ quad lag 4   Hip Extension 4-    Hip ABD 3+ 4-   Hip ADD nt nt   Hip ER 3+    Hip IR 4-      Knee Left(1-5) Right (1-5)   Knee Flexion 4    Knee Extension 3 (not full range)    Ankle PF 3+ 4   Ankle DF     Other        Current: Same as IE     2. Patient will report pain less than 2/10 on average and no more than a 5/10 at worst to aid in completion of ADLs. Status at IE:best pain is a 4/10, worst pain is a 10/10   Current: Same as IE    3. Patient will have at least . 5 cm decrease in left knee swelling to improve left knee ROM to increase ease with functional activities.    Status at IE:Swelling (cm): 2 inches below mid patella: Right:42 Left:43  Mid patella: Right:50 Left: 50.5  2 inches above mid patella: Right:54.5 Left:55      Current: Same as IE    4. Patient will improve FOTO to 55 points overall to demonstrate improvement in functional ability. Status at IE:40   Current: Same as IE     5. Patient will demonstrate left knee extension ROM to at least 0 degrees in order to normalize gait pattern. Status at IE: lacking 15 degrees of left knee extension. Pt with antalgic gait pattern. 6. Pt will improve left single leg stance to at least 5 seconds to improve stability with stair negotiation. Status at IE: left single leg stance < 1 sec. 7. Pt will demonstrate left single leg squat without UE support at least 15 times in order to improve stability with stair negotiation. Status at IE: pt is unable to perform a left single leg squat even with bilateral UE support.        *FOTO score is an established functional score where 100 = no disability*    PLAN  [x]  Upgrade activities as tolerated     [x]  Continue plan of care  [x]  Update interventions per flow sheet       []  Discharge due to:_  []  Other:_      Diandra Cadena, PT 7/27/2021  10:23 AM

## 2021-09-20 ENCOUNTER — HOSPITAL ENCOUNTER (OUTPATIENT)
Dept: PHYSICAL THERAPY | Age: 46
Discharge: HOME | End: 2021-09-20
Payer: MEDICAID

## 2021-09-20 PROCEDURE — 97112 NEUROMUSCULAR REEDUCATION: CPT

## 2021-09-20 PROCEDURE — 97530 THERAPEUTIC ACTIVITIES: CPT

## 2021-09-20 PROCEDURE — 97110 THERAPEUTIC EXERCISES: CPT

## 2021-09-20 NOTE — PROGRESS NOTES
PT DAILY TREATMENT NOTE    Patient Name: Laura Serrano  Date:2021  : 1975  [x]  Patient  Verified  Payor: BLUE CROSS MEDICAID / Plan: [facility] [employer] HEALTHKEEPERS PLUS / Product Type: Managed Care Medicaid /    In time: 4:30pm  Out time:5:12  Total Treatment Time (min): 42  Total Timed Codes (min): 42  1:1 Treatment Time ([facility] only): 42   Visit #: 2 of 12    Treatment Dx: Left knee pain [M25.562]    SUBJECTIVE  Pain Level (0-10 scale): 6  Any medication changes, allergies to medications, adverse drug reactions, diagnosis change, or new procedure performed?: [x] No    [] Yes (see summary sheet for update)  Subjective functional status/changes:   [] No changes reported  2-3 falls in the last months due to left knee buckling. Stairs: The pt reported that she must takes multiple breaks to complete a flight of stairs; bilateral hand rails, step to pattern, ascending worse than descending     [place] (example at [facility]) causes knee pain and must take breaks. Flat ground - can walk approx 5 minutes prior to the break. She is scared that she will drop her grand daughter while carrying her. Recent imaging shows arthritis     Long periods of time sitting causes pain with Sit <> stand    OBJECTIVE    15 min Therapeutic Exercise:  [x] See flow sheet :   Rationale: increase ROM and increase strength to improve the patients ability to return to PLOF    18 min Therapeutic Activity:  [x]  See flow sheet :   Rationale: increase ROM, increase strength and improve coordination  to improve the patients ability to perform daily activities with decreased pain and symptom levels    9 min Neuromuscular Re-education:  [x]  See flow sheet : TC for HEP to perform with good body mechanics.  Assess pt's static and dynamic balance goals   Rationale: increase ROM, improve coordination, improve balance and increase proprioception  to improve the patients ability to perform daily activities with decreased pain and symptom levels             With   [] TE   [] TA   [x] neuro   [] other: Patient Education: [x] Review HEP    [] Progressed/Changed HEP based on:   [] positioning   [] body mechanics   [] transfers   [] heat/ice application    [] other:      Other Objective/Functional Measures: updated goals for PN     Pain Level (0-10 scale) post treatment: 7    ASSESSMENT/Changes in Function:  Patient is a 40 yo female who presented to [facility] with c/o left knee pain. Patient is s/p left ACL, MCL and meniscal reconstruction on March 2020. The pt reports left knee pain with stairs, sit <> stand, and walking for greater than 5 minutes. The pt completion of stairs with bilateral hand rails, step to pattern, and pain worst with ascending > descending. The pt continues to demo impairemtns in LE gross and functional LE strength, knee ROM, and pain that impacts activity tolerance. Patient will continue to benefit from skilled PT services to modify and progress therapeutic interventions, address functional mobility deficits, address ROM deficits, address strength deficits, analyze and address soft tissue restrictions, analyze and cue movement patterns, analyze and modify body mechanics/ergonomics, assess and modify postural abnormalities, address imbalance/dizziness and instruct in home and community integration to attain remaining goals. [x]  See Plan of Care  []  See progress note/recertification  []  See Discharge Summary         Progress towards goals / Updated goals:  Short Term Goals: To be accomplished in 2 weeks: 1. Patient will report compliance with HEP at least 1x/day to aid in rehabilitation program.         Status at IE: patient was given HEP. Current 9/20/21: pt reports compliance with HEP every few days            2.Patient will demonstrate left knee flexion AROM of at least 120 degrees to aid in completion of ADLs. Status at IE:115 degrees.            Current 9/20/21: 120 deg - MET        Long Term Goals: To be accomplished in 4 weeks: 1. Patient will increase strength by at least . 5 grade MMT throughout B LEs to aid in completion of ADLs. Status at [facility] (MMT):5/5 with exceptions indicated below. Hip Left (1-5) Right (1-5)   Hip Flexion 3+ quad lag 4   Hip Extension 4-     Hip ABD 3+ 4-   Hip ADD nt nt   Hip ER 3+     Hip IR 4-        Knee Left(1-5) Right (1-5)   Knee Flexion 4     Knee Extension 3 (not full range)     Ankle PF 3+ 4   Ankle DF       Other              Current 9/20/21:       progressing        Hip Left (1-5) Right (1-5)   Hip Flexion 4+ 4+   Hip Extension NT NT   Hip ABD (sidelying) 4- 4+   Hip ADD (sitting)  5 5   Hip ER 4 4   Hip IR 4 4      Knee Left(1-5) Right (1-5)   Knee Flexion 4 5   Knee Extension 4 5   Ankle PF 5 5   Ankle DF  5 5    Other                   2.Patient will report pain less than 2/10 on average and no more than a 5/10 at worst to aid in completion of ADLs. Status at IE:best pain is a 4/10, worst pain is a 10/10          Current 9/20/21: within the last week best pain = 4/10, worst pain is a 9/10 - progressing      3. Patient will have at least . 5 cm decrease in left knee swelling to improve left knee ROM to increase ease with functional activities. Status at IE:Swelling (cm): 2 inches below mid patella: Right:42 Left:43  Mid patella: Right:50 Left: 50.5  2 inches above mid patella: Right:54.5 Left:55    Current 9/20/21:  2 inches below mid patella: Right:42cm  Left:43cm ; Mid patella: Right:50cm Left: 51cm ; 2 inches above mid patella: Right: 55cm Left:57 cm - progressing      4. Patient will improve FOTO to 55 points overall to demonstrate improvement in functional ability. Status at IE:40            5. Patient will demonstrate left knee extension ROM to at least 0 degrees in order to normalize gait pattern. Status at IE: lacking 15 degrees of left knee extension. Pt with antalgic gait pattern. Current 9/20/21: - 5 deg - progressing             6. Pt will improve left single leg stance to at least 5 seconds to improve stability with stair negotiation. Status at IE: left single leg stance < 1 sec. Current 9/20/21: 12 sec left leg            7. Pt will demonstrate left single leg squat without UE support at least 15 times in order to improve stability with stair negotiation. Status at IE: pt is unable to perform a left single leg squat even with bilateral UE support. Current 9/20/21: DL squat 10x with left knee valgus and reports of knee pain; Discharge this goal due to patients inability complete - see new updated functional squat goal below            *FOTO score is an established functional score where 100 = no disability*    Updated goals to be completed in 4 weeks:  1. The pt will be able to demo DL squat 10x with >/= 30lbs pain free and good body mechanics to demo increase LE functional strength to lift and carry her grandchild. Current 9/20/21: DL squat 10x with left knee valgus and reports of knee pain    2. The pt will be able to demo ability to complete SL HR 15x, bilaterally, with good body mechanics to demo increase LE function strength for stairs and community based activities. Current 9/20/21: SL HR right = 5, SL HR left = 1    3. The pt will complete 3 stairs 4x to demo reciprocally without UE support to demo increased functional LE strength for home and community based activities. Current 9/20/21: The pt reported that she must takes multiple breaks to complete a flight of stairs; bilateral hand rails, step to pattern, ascending worse than descending       PLAN  []  Upgrade activities as tolerated     [x]  Continue plan of care  []  Update interventions per flow sheet       []  Discharge due to:_  []  Other:_      Molly Nash, PT 9/20/2021  4:43 PM    No future appointments.

## 2021-09-20 NOTE — PROGRESS NOTES
[facility]  [address], [address] [facility]  Ph [phone]  Fx [phone]    Physical Therapy Progress Note  Patient name: Glenn Cabrales Start of Care:  2021   Referral source: Jorge Moon MD : 1975   Medical/Treatment Diagnosis: Left knee pain [M25.562] Onset Date:2020   Prior Hospitalization: see medical history Provider#: [id number]   Medications: Verified on Patient Summary List    Comorbidities/PMHx/Surgical Hx: left knee surgery ACL repair and meniscal and MCL in 2020. Stomach-hernia repair surgery , , gallbladder removal, hernia repair with obstruction.   [x]? ? Other-arthritis, IBS.    Prior level of function: functionally independent, no AD, moderately active lifestyle, she could work 12-13 hour days.     Visits from Start of Care: 2    Missed Visits: 2    Updated Goals/Measure of Progress: To be achieved in 6 weeks:    Short Term Goals: To be accomplished in 2 weeks: 1. Patient will report compliance with HEP at least 1x/day to aid in rehabilitation program.         Status at IE: patient was given HEP. Current 21: pt reports compliance with HEP every few days            2.Patient will demonstrate left knee flexion AROM of at least 120 degrees to aid in completion of ADLs. Status at IE:115 degrees. Current 21: 120 deg - MET        Long Term Goals: To be accomplished in 4 weeks: 1. Patient will increase strength by at least . 5 grade MMT throughout B LEs to aid in completion of ADLs. Status at [address] [place] [address] (MMT): with exceptions indicated below.                                              Hip Left (1-5) Right (1-5)   Hip Flexion 3+ quad lag 4   Hip Extension 4-     Hip ABD 3+ 4-   Hip ADD nt nt   Hip ER 3+     Hip IR 4-        Knee Left(1-5) Right (1-5)   Knee Flexion 4     Knee Extension 3 (not full range)     Ankle PF 3+ 4   Ankle DF       Other              Current 21: progressing        Hip Left (1-5) Right (1-5)   Hip Flexion 4+ 4+   Hip Extension NT NT   Hip ABD (sidelying) 4- 4+   Hip ADD (sitting)  5 5   Hip ER 4 4   Hip IR 4 4      Knee Left(1-5) Right (1-5)   Knee Flexion 4 5   Knee Extension 4 5   Ankle PF 5 5   Ankle DF  5 5    Other                   2.Patient will report pain less than 2/10 on average and no more than a 5/10 at worst to aid in completion of ADLs. Status at IE:best pain is a 4/10, worst pain is a 10/10          Current 9/20/21: within the last week best pain = 4/10, worst pain is a 9/10 - progressing      3. Patient will have at least . 5 cm decrease in left knee swelling to improve left knee ROM to increase ease with functional activities. Status at IE:Swelling (cm): 2 inches below mid patella: Right:42 Left:43  Mid patella: Right:50 Left: 50.5  2 inches above mid patella: Right:54.5 Left:55    Current 9/20/21:  2 inches below mid patella: Right:42cm  Left:43cm ; Mid patella: Right:50cm Left: 51cm ; 2 inches above mid patella: Right: 55cm Left:57 cm - progressing      4. Patient will improve FOTO to 55 points overall to demonstrate improvement in functional ability. Status at IE:40            5. Patient will demonstrate left knee extension ROM to at least 0 degrees in order to normalize gait pattern. Status at IE: lacking 15 degrees of left knee extension. Pt with antalgic gait pattern. Current 9/20/21: - 5 deg - progressing             6. Pt will improve left single leg stance to at least 5 seconds to improve stability with stair negotiation. Status at IE: left single leg stance < 1 sec. Current 9/20/21: 12 sec left leg            7. Pt will demonstrate left single leg squat without UE support at least 15 times in order to improve stability with stair negotiation. Status at IE: pt is unable to perform a left single leg squat even with bilateral UE support.     Current 9/20/21: DL squat 10x with left knee valgus and reports of knee pain; Discharge this goal due to patients inability complete - see new updated functional squat goal below            *FOTO score is an established functional score where 100 = no disability*    Updated goals to be completed in 4 weeks:  1. The pt will be able to demo DL squat 10x with >/= 30lbs pain free and good body mechanics to demo increase LE functional strength to lift and carry her grandchild. Current 9/20/21: DL squat 10x with left knee valgus and reports of knee pain    2. The pt will be able to demo ability to complete SL HR 15x, bilaterally, with good body mechanics to demo increase LE function strength for stairs and community based activities. Current 9/20/21: SL HR right = 5, SL HR left = 1    3. The pt will complete 3 stairs 4x to demo reciprocally without UE support to demo increased functional LE strength for home and community based activities. Current 9/20/21: The pt reported that she must takes multiple breaks to complete a flight of stairs; bilateral hand rails, step to pattern, ascending worse than descending     Summary of Care/ Key Functional Changes:  Patient is a 40 yo female who [facility] to [facility] with c/o left knee pain. Patient is s/p left ACL, MCL and meniscal reconstruction on March 2020. The pt reports left knee pain with stairs, sit <> stand, and walking for greater than 5 minutes. The pt completion of stairs with bilateral hand rails, step to pattern, and pain worst with ascending > descending. The pt continues to demo impairemtns in LE gross and functional LE strength, knee ROM, and pain that impacts activity tolerance.   Patient will continue to benefit from skilled PT services to modify and progress therapeutic interventions, address functional mobility deficits, address ROM deficits, address strength deficits, analyze and address soft tissue restrictions, analyze and cue movement patterns, analyze and modify body mechanics/ergonomics, assess and modify postural abnormalities, address imbalance/dizziness and instruct in home and community integration to attain remaining goals.       ASSESSMENT/RECOMMENDATIONS:  []Continue therapy per initial plan/protocol at a frequency of  2 x per week for 6 weeks  []Continue therapy with the following recommended changes:_____________________ _____________________________ ________________________________________  []Discontinue therapy progressing towards or have reached established goals  []Discontinue therapy due to lack of appreciable progress towards goals  []Discontinue therapy due to lack of attendance or compliance  []Await Physician's recommendations/decisions regarding therapy  []Other:________________________________________________________________    Thank you for this referral.   Latoya Daigle, PT 9/20/2021 5:57 PM

## 2021-09-22 ENCOUNTER — HOSPITAL ENCOUNTER (OUTPATIENT)
Dept: PHYSICAL THERAPY | Age: 46
Discharge: HOME | End: 2021-09-22
Payer: MEDICAID

## 2021-09-22 PROCEDURE — 97110 THERAPEUTIC EXERCISES: CPT

## 2021-09-22 PROCEDURE — 97016 VASOPNEUMATIC DEVICE THERAPY: CPT

## 2021-09-22 PROCEDURE — 97530 THERAPEUTIC ACTIVITIES: CPT

## 2021-09-22 PROCEDURE — 97116 GAIT TRAINING THERAPY: CPT

## 2021-09-22 NOTE — PROGRESS NOTES
PT DAILY TREATMENT NOTE    Patient Name: Praful Duong  Date:2021  : 1975  [x]  Patient  Verified  Payor: BLUE CROSS MEDICAID / Plan: [facility] HEALTHKEEPERS PLUS / Product Type: Managed Care Medicaid /    In time:1102  Out time:1152  Total Treatment Time (min): 45  Total Timed Codes (min): 45  1:1 Treatment Time ([address] only): 39   Visit #: 3 of 12    Treatment Dx: Left knee pain [M25.562]    SUBJECTIVE  Pain Level (0-10 scale): 5  Any medication changes, allergies to medications, adverse drug reactions, diagnosis change, or new procedure performed?: [x] No    [] Yes (see summary sheet for update)  Subjective functional status/changes:   [] No changes reported  Pt reported 4/10 lowest pain and 10/10 pain at its worse. The worse pain was after spending approximately 7-71/2 hours at [employer] [employer]. She reported difficulty with ascending the hills (needing to standing rest breaks) and no Pt reported icing and elevating her left LE to decrease the pain. Pt reported no falls since last visit.       OBJECTIVE    Modalities Rationale:     decrease edema, decrease inflammation and decrease pain to improve patient's ability to increase ease of ambulation and step negotiation   min [] Estim, type/location:                                      []  att     []  unatt     []  w/US     []  w/ice    []  w/heat    min []  Mechanical Traction: type/lbs                   []  pro   []  sup   []  int   []  cont    []  before manual    []  after manual    min []  Ultrasound, settings/location:      min []  Iontophoresis w/ dexamethasone, location:                                               []  take home patch       []  in clinic    min []  Ice     []  Heat    location/position:    10 min [x]  Vasopneumatic Device, press/temp: Low/48   If using vaso (only need to measure limb vaso being performed on)      pre-treatment girth : 52.9 cm      post-treatment girth : 50.6 cm      measured at (landmark location) :  Mid patella (pt wore sweat pants)    min []  Other:    [x] Skin assessment post-treatment (if applicable):    [x]  intact    []  redness- no adverse reaction                  []redness  adverse reaction:          25 min Therapeutic Exercise:  [x] See flow sheet :   Rationale: increase ROM, increase strength and improve coordination to improve the patients ability to perform gait, step negotiation, and recreational activities. 10 min Therapeutic Activity:  []  See flow sheet : Step negotiation   Rationale: increase ROM, increase strength and improve coordination  to improve the patients ability to increase ease of negotiating her steps with/without carrying items            With   [x] TE   [x] TA   [] neuro   [] other: Patient Education: [x] Review HEP    [] Progressed/Changed HEP based on:   [] positioning   [] body mechanics   [] transfers   [] heat/ice application    [] other:           Pain Level (0-10 scale) post treatment: 6    ASSESSMENT/Changes in Function:  Pt continues to have left knee pain (best 4/10 and worst 10/10). Pt demonstrates an antalgic gait pattern with increased lateral shift. Pt was able to ascend4 8\" steps B HR with extensive use of UE with left foot leading. Pt tolerated and reported no increase in pain when performing 4\" step ups  with left foot leading and use of //bars with B UE. Pt continues to demonstrate impairments with left LE during gait, stair negotiation, active ROM, and pain which impacts her ability to tolerate and perform ADLs. Patient will continue to benefit from skilled PT services to modify and progress therapeutic interventions, address functional mobility deficits, address ROM deficits, address strength deficits, analyze and cue movement patterns, analyze and modify body mechanics/ergonomics and instruct in home and community integration to attain remaining goals.      [x]  See Plan of Care  []  See progress note/recertification  []  See Discharge Summary Progress towards goals / Updated goals:  *Short Term Goals: To be accomplished in 2 weeks: 1. Patient will report compliance with HEP at least 1x/day to aid in rehabilitation program.         Status at IE: patient was given HEP.        Current 9/20/21: pt reports compliance with HEP every few days            2.Patient will demonstrate left knee flexion AROM of at least 120 degrees to aid in completion of ADLs.        Status at IE:115 degrees.         Current 9/20/21: 120 deg - MET        Long Term Goals: To be accomplished in 4 weeks: 1. Patient will increase strength by at least . 5 grade MMT throughout B LEs to aid in completion of ADLs.              [facility] at [address] [facility] (MMT):5/5 with exceptions indicated below.                                             Hip Left (1-5) Right (1-5)   Hip Flexion 3+ quad lag 4   Hip Extension 4-     Hip ABD 3+ 4-   Hip ADD nt nt   Hip ER 3+     Hip IR 4-        Knee Left(1-5) Right (1-5)   Knee Flexion 4     Knee Extension 3 (not full range)     Ankle PF 3+ 4   Ankle DF       Other              Current 9/20/21:       progressing        Hip Left (1-5) Right (1-5)   Hip Flexion 4+ 4+   Hip Extension NT NT   Hip ABD (sidelying) 4- 4+   Hip ADD (sitting)  5 5   Hip ER 4 4   Hip IR 4 4      Knee Left(1-5) Right (1-5)   Knee Flexion 4 5   Knee Extension 4 5   Ankle PF 5 5   Ankle DF  5 5    Other                    2.Patient will report pain less than 2/10 on average and no more than a 5/10 at worst to aid in completion of ADLs.        Status at IE:best pain is a 4/10, worst pain is a 10/10          Current 9/20/21: within the last week best pain = 4/10, worst pain is a 9/10 - progressing      3. Patient will have at least . 5 cm decrease in left knee swelling to improve left knee ROM to increase ease with functional activities.                [facility] at [address] [place] [address] (cm): 2 inches below mid patella: Right:42 Left:43  Mid patella: Right:50 Left: 50.5  2 inches above mid patella: Right:54.5 Left:55    Current 9/20/21:  2 inches below mid patella: Right:42cm  Left:43cm ; Mid patella: Right:50cm Left: 51cm ; 2 inches above mid patella: Right: 55cm Left:57 cm - progressing      4. Patient will improve FOTO to 55 points overall to demonstrate improvement in functional ability.        Status at IE:40            5. Patient will demonstrate left knee extension ROM to at least 0 degrees in order to normalize gait pattern.         Status at IE: lacking 15 degrees of left knee extension. Pt with antalgic gait pattern.            9/20/21: - 5 deg - progressing   Current 9/22/21 : left knee extension lacks 5 degrees  -progressing            6. Pt will improve left single leg stance to at least 5 seconds to improve stability with stair negotiation.         Status at IE: left single leg stance < 1 sec.    Current 9/20/21: 12 sec left leg            7. Pt will demonstrate left single leg squat without UE support at least 15 times in order to improve stability with stair negotiation.         Status at IE: pt is unable to perform a left single leg squat even with bilateral UE support.    Current 9/20/21: DL squat 10x with left knee valgus and reports of knee pain; Discharge this goal due to patients inability complete - see new updated functional squat goal below            *FOTO score is an established functional score where 100 = no disability*     Updated goals to be completed in 4 weeks:  1. The pt will be able to demo DL squat 10x with >/= 30lbs pain free and good body mechanics to demo increase LE functional strength to lift and carry her grandchild. Current 9/20/21: DL squat 10x with left knee valgus and reports of knee pain     2. The pt will be able to demo ability to complete SL HR 15x, bilaterally, with good body mechanics to demo increase LE function strength for stairs and community based activities. Current 9/20/21: SL HR right = 5, SL HR left = 1     3.  The pt will complete 3 stairs 4x to demo reciprocally without UE support to demo increased functional LE strength for home and community based activities.     9/20/21: The pt reported that she must takes multiple breaks to complete a flight of stairs; bilateral hand rails, step to pattern, ascending worse than descending   Current 9/22/21:  Pt ascended 4 8\" steps with B HR in a reciprocal pattern with extensive UE use when left foot led.       **    PLAN  []  Upgrade activities as tolerated     []  Continue plan of care  []  Update interventions per flow sheet       []  Discharge due to:_  []  Other:_      Brendan Age, PT 9/22/2021  11:07 AM    Future Appointments   Date Time Provider Lyric Villanueva   9/29/2021  4:15 PM [facility]

## 2021-09-29 ENCOUNTER — HOSPITAL ENCOUNTER (OUTPATIENT)
Dept: PHYSICAL THERAPY | Age: 46
Discharge: HOME | End: 2021-09-29
Payer: MEDICAID

## 2021-09-29 PROCEDURE — 97530 THERAPEUTIC ACTIVITIES: CPT

## 2021-09-29 PROCEDURE — 97016 VASOPNEUMATIC DEVICE THERAPY: CPT

## 2021-09-29 PROCEDURE — 97112 NEUROMUSCULAR REEDUCATION: CPT

## 2021-09-29 PROCEDURE — 97110 THERAPEUTIC EXERCISES: CPT

## 2021-09-29 NOTE — PROGRESS NOTES
PT DAILY TREATMENT NOTE    Patient Name: Maddie Parent  Date:2021  : 1975  [x]  Patient  Verified  Payor: BLUE CROSS MEDICAID / Plan: [facility] HEALTHKEEPERS PLUS / Product Type: Managed Care Medicaid /    In time:12:56 PM  Out time:1:45 PM  Total Treatment Time (min): 49  Total Timed Codes (min): 39  1:1 Treatment Time ([facility] only): 39   Visit #: 4 of 12    Treatment Dx: Left knee pain [M25.562]    SUBJECTIVE  Pain Level (0-10 scale): 7  Any medication changes, allergies to medications, adverse drug reactions, diagnosis change, or new procedure performed?: [x] No    [] Yes (see summary sheet for update)  Subjective functional status/changes:   [] No changes reported  Patient reports 7/10 pain. \"It's hurting. It kept me up all night. \" Patient reports that she iced her knee this morning and took Tylenol.      OBJECTIVE    Modalities Rationale:     decrease edema, decrease inflammation and decrease pain to improve patient's ability to ease post session soreness   min [] Estim, type/location:                                      []  att     []  unatt     []  w/US     []  w/ice    []  w/heat    min []  Mechanical Traction: type/lbs                   []  pro   []  sup   []  int   []  cont    []  before manual    []  after manual    min []  Ultrasound, settings/location:      min []  Iontophoresis w/ dexamethasone, location:                                               []  take home patch       []  in clinic    min []  Ice     []  Heat    location/position:     min []  Vasopneumatic Device, press/temp:    If using vaso (only need to measure limb vaso being performed on)      pre-treatment girth : 49.5 cm      post-treatment girth : 49 cm      measured at (landmark location) : midpatella     10 min []  Other:    [] Skin assessment post-treatment (if applicable):    []  intact    []  redness- no adverse reaction                  []redness - adverse reaction:            21 min Therapeutic Exercise:  [x] See flow sheet :   Rationale: increase ROM, increase strength and improve coordination to improve the patients ability to increase ease with ADLs    10 min Therapeutic Activity:  [x]  See flow sheet : step ups, forward and lateral. Bridging. Rationale: increase strength and improve coordination  to improve the patients ability to ease with ADLs    8 min Neuromuscular Re-education:  [x]  See flow sheet : VMO emphasis with quad activity   Rationale: increase strength and improve coordination  to improve the patients ability to ease with ADLs       With   [] TE   [] TA   [] neuro   [] other: Patient Education: [x] Review HEP    [] Progressed/Changed HEP based on:   [] positioning   [] body mechanics   [] transfers   [] heat/ice application    [] other:      Other Objective/Functional Measures:   Left SLS: 23 seconds     Pain Level (0-10 scale) post treatment: 5    ASSESSMENT/Changes in Function:   Patient does well with added standing activities void of adverse effect. Static balance has improved since [facility]. Quad lag noted towards end of SLR with ankle weight second set due to fatigue. Consider adding bandwalks in upcoming visit. Patient will continue to benefit from skilled PT services to modify and progress therapeutic interventions, address functional mobility deficits, address ROM deficits, address strength deficits, analyze and address soft tissue restrictions, analyze and cue movement patterns, analyze and modify body mechanics/ergonomics, assess and modify postural abnormalities and instruct in home and community integration to attain remaining goals. [x]  See Plan of Care  []  See progress note/recertification  []  See Discharge Summary         Progress towards goals / Updated goals:  *Short Term Goals: To be accomplished in 2 weeks: 1. Patient will report compliance with HEP at least 1x/day to aid in rehabilitation program.         Status at IE: patient was given HEP.           Current 9/20/21: pt reports compliance with HEP every few days            2.Patient will demonstrate left knee flexion AROM of at least 120 degrees to aid in completion of ADLs.        Status at IE:115 degrees.         Current 9/20/21: 120 deg - MET        Long Term Goals: To be accomplished in 4 weeks: 1. Patient will increase strength by at least . 5 grade MMT throughout B LEs to aid in completion of ADLs.              [facility] at [address] [facility] (MMT):5/5 with exceptions indicated below.                                             Hip Left (1-5) Right (1-5)   Hip Flexion 3+ quad lag 4   Hip Extension 4-     Hip ABD 3+ 4-   Hip ADD nt nt   Hip ER 3+     Hip IR 4-        Knee Left(1-5) Right (1-5)   Knee Flexion 4     Knee Extension 3 (not full range)     Ankle PF 3+ 4   Ankle DF       Other              Current 9/20/21:       progressing        Hip Left (1-5) Right (1-5)   Hip Flexion 4+ 4+   Hip Extension NT NT   Hip ABD (sidelying) 4- 4+   Hip ADD (sitting)  5 5   Hip ER 4 4   Hip IR 4 4      Knee Left(1-5) Right (1-5)   Knee Flexion 4 5   Knee Extension 4 5   Ankle PF 5 5   Ankle DF  5 5    Other                    2.Patient will report pain less than 2/10 on average and no more than a 5/10 at worst to aid in completion of ADLs.        Status at IE:best pain is a 4/10, worst pain is a 10/10          Current 9/20/21: within the last week best pain = 4/10, worst pain is a 9/10 - progressing      3. Patient will have at least . 5 cm decrease in left knee swelling to improve left knee ROM to increase ease with functional activities.              [facility] at [address] [facility] (cm): 2 inches below mid patella: Right:42 Left:43  Mid patella: Right:50 Left: 50.5  2 inches above mid patella: Right:54.5 Left:55    Current 9/20/21:  2 inches below mid patella: Right:42cm  Left:43cm ; Mid patella: Right:50cm Left: 51cm ; 2 inches above mid patella: Right: 55cm Left:57 cm - progressing      4. Patient will improve FOTO to 55 points overall to demonstrate improvement in functional ability.        Status at IE:40            5. Patient will demonstrate left knee extension ROM to at least 0 degrees in order to normalize gait pattern.         Status at IE: lacking 15 degrees of left knee extension. Pt with antalgic gait pattern.            9/20/21: - 5 deg - progressing   Current 9/22/21 : left knee extension lacks 5 degrees  -progressing            6. Pt will improve left single leg stance to at least 5 seconds to improve stability with stair negotiation.         Status at IE: left single leg stance < 1 sec.    Current 9/20/21: 12 sec left leg met, able to maintain left SLS x23 seconds (9/29/21)            7. Pt will demonstrate left single leg squat without UE support at least 15 times in order to improve stability with stair negotiation.         Status at IE: pt is unable to perform a left single leg squat even with bilateral UE support.    Current 9/20/21: DL squat 10x with left knee valgus and reports of knee pain; Discharge this goal due to patients inability complete - see new updated functional squat goal below            *FOTO score is an established functional score where 100 = no disability*     Updated goals to be completed in 4 weeks:  1. The pt will be able to demo DL squat 10x with >/= 30lbs pain free and good body mechanics to demo increase LE functional strength to lift and carry her grandchild. Current 9/20/21: DL squat 10x with left knee valgus and reports of knee pain     2. The pt will be able to demo ability to complete SL HR 15x, bilaterally, with good body mechanics to demo increase LE function strength for stairs and community based activities. Current 9/20/21: SL HR right = 5, SL HR left = 1     3. The pt will complete 3 stairs 4x to demo reciprocally without UE support to demo increased functional LE strength for home and community based activities.     9/20/21: The pt reported that she must takes multiple breaks to complete a flight of stairs; bilateral hand rails, step to pattern, ascending worse than descending   Current 9/22/21:  Pt ascended 4 8\" steps with B HR in a reciprocal pattern with extensive UE use when left foot led.         PLAN  []  Upgrade activities as tolerated     [x]  Continue plan of care  []  Update interventions per flow sheet       []  Discharge due to:_  []  Other:_      Felicia Hinkle 9/29/2021  12:17 PM    Future Appointments   Date Time Provider Lyric Villanueva   9/29/2021 12:45 PM Yee Canales [facility]

## 2021-10-08 ENCOUNTER — APPOINTMENT (OUTPATIENT)
Dept: PHYSICAL THERAPY | Age: 46
End: 2021-10-08

## 2021-10-11 ENCOUNTER — APPOINTMENT (OUTPATIENT)
Dept: PHYSICAL THERAPY | Age: 46
End: 2021-10-11

## 2021-10-13 ENCOUNTER — APPOINTMENT (OUTPATIENT)
Dept: PHYSICAL THERAPY | Age: 46
End: 2021-10-13

## 2021-10-27 ENCOUNTER — APPOINTMENT (OUTPATIENT)
Dept: PHYSICAL THERAPY | Age: 46
End: 2021-10-27

## 2021-10-27 NOTE — PROGRESS NOTES
[facility]  [address], [address] [facility]  Ph [phone]  Fx [phone]    Physical Therapy Discharge Summary    Patient name: Marlena Lopes Start of Care: 21   Referral source: Cyndi Stein MD : [id number]   Medical/Treatment Diagnosis: Left knee pain [M25.562] Onset Date:2020     Prior Hospitalization: see medical history Provider#: [id number]   Medications: Verified on Patient Summary List    Comorbidities/PMHx/Surgical Hx: left knee surgery ACL repair and meniscal and MCL in 2020. Stomach-hernia repair surgery , , gallbladder removal, hernia repair with obstruction.   [x]? ? Other-arthritis, IBS.    Prior Level of Function:functionally independent, no AD, moderately active lifestyle, she could work 12-13 hour days    Visits from Start of Care: 4    Missed Visits: 3    Reporting Period : 21 to 10/22/21    Goals/Measure of Progress:  *Short Term Goals: To be accomplished in 2 weeks: 1. Patient will report compliance with HEP at least 1x/day to aid in rehabilitation program.         Status at IE: patient was given HEP.        Current 21: pt reports compliance with HEP every few days            2.Patient will demonstrate left knee flexion AROM of at least 120 degrees to aid in completion of ADLs.        Status at IE:115 degrees.         Current 21: 120 deg - MET        Long Term Goals: To be accomplished in 4 weeks: 1. Patient will increase strength by at least . 5 grade MMT throughout B LEs to aid in completion of ADLs.                [facility] at [address] [facility] (MMT): with exceptions indicated below.                                             Hip Left (1-5) Right (1-5)   Hip Flexion 3+ quad lag 4   Hip Extension 4-     Hip ABD 3+ 4-   Hip ADD nt nt   Hip ER 3+     Hip IR 4-        Knee Left(1-5) Right (1-5)   Knee Flexion 4     Knee Extension 3 (not full range)     Ankle PF 3+ 4   Ankle DF       Other              Current 21:       progressing        Hip Left (1-5) Right (1-5)   Hip Flexion 4+ 4+   Hip Extension NT NT   Hip ABD (sidelying) 4- 4+   Hip ADD (sitting)  5 5   Hip ER 4 4   Hip IR 4 4      Knee Left(1-5) Right (1-5)   Knee Flexion 4 5   Knee Extension 4 5   Ankle PF 5 5   Ankle DF  5 5    Other                    2.Patient will report pain less than 2/10 on average and no more than a 5/10 at worst to aid in completion of ADLs.        Status at IE:best pain is a 4/10, worst pain is a 10/10          Current 9/20/21: within the last week best pain = 4/10, worst pain is a 9/10 - progressing      3. Patient will have at least . 5 cm decrease in left knee swelling to improve left knee ROM to increase ease with functional activities.              [facility] at [address] (cm): 2 inches below mid patella: Right:42 Left:43  Mid patella: Right:50 Left: 50.5  2 inches above mid patella: Right:54.5 Left:55    Current 9/20/21:  2 inches below mid patella: Right:42cm  Left:43cm ; Mid patella: Right:50cm Left: 51cm ; 2 inches above mid patella: Right: 55cm Left:57 cm - progressing      4. Patient will improve FOTO to 55 points overall to demonstrate improvement in functional ability.        Status at IE:40            5. Patient will demonstrate left knee extension ROM to at least 0 degrees in order to normalize gait pattern.         Status at IE: lacking 15 degrees of left knee extension.  Pt with antalgic gait pattern.            9/20/21: - 5 deg - progressing   Current 9/22/21 : left knee extension lacks 5 degrees  -progressing            6. Pt will improve left single leg stance to at least 5 seconds to improve stability with stair negotiation.         Status at IE: left single leg stance < 1 sec.    Current 9/20/21: 12 sec left leg met, able to maintain left SLS x23 seconds (9/29/21)            7. Pt will demonstrate left single leg squat without UE support at least 15 times in order to improve stability with stair negotiation.         Status at IE: pt is unable to perform a left single leg squat even with bilateral UE support.    Current 9/20/21: DL squat 10x with left knee valgus and reports of knee pain; Discharge this goal due to patients inability complete - see new updated functional squat goal below            *FOTO score is an established functional score where 100 = no disability*     Updated goals to be completed in 4 weeks:  1. The pt will be able to demo DL squat 10x with >/= 30lbs pain free and good body mechanics to demo increase LE functional strength to lift and carry her grandchild. Current 9/20/21: DL squat 10x with left knee valgus and reports of knee pain     2. The pt will be able to demo ability to complete SL HR 15x, bilaterally, with good body mechanics to demo increase LE function strength for stairs and community based activities. Current 9/20/21: SL HR right = 5, SL HR left = 1     3. The pt will complete 3 stairs 4x to demo reciprocally without UE support to demo increased functional LE strength for home and community based activities.    9/20/21: The pt reported that she must takes multiple breaks to complete a flight of stairs; bilateral hand rails, step to pattern, ascending worse than descending   Current 9/22/21:  Pt ascended 4 8\" steps with B HR in a reciprocal pattern with extensive UE use when left foot led.           Assessment/ Summary of Care: Patient will be discharged from therapy due to number of no shows. Patient self reported fair to poor compliance with her HEP. She did improve her AROM for knee flexion and extension, strength, and single leg stance time.       RECOMMENDATIONS:  [x]Discontinue therapy: []Patient has reached or is progressing toward set goals      [x]Patient is non-compliant or has abdicated      []Due to lack of appreciable progress towards set goals    Tong Whitehead, PT 10/27/2021 5:43 PM

## 2021-10-29 ENCOUNTER — APPOINTMENT (OUTPATIENT)
Dept: PHYSICAL THERAPY | Age: 46
End: 2021-10-29

## 2022-09-15 ENCOUNTER — HOSPITAL ENCOUNTER (OUTPATIENT)
Dept: PREADMISSION TESTING | Age: 47
Discharge: HOME | End: 2022-09-15
Payer: MEDICAID

## 2022-09-15 ENCOUNTER — HOSPITAL ENCOUNTER (OUTPATIENT)
Dept: LAB | Age: 47
Discharge: HOME | End: 2022-09-15

## 2022-09-15 LAB
ATRIAL RATE: 89 BPM
CALCULATED P AXIS, ECG09: 61 DEGREES
CALCULATED R AXIS, ECG10: 67 DEGREES
CALCULATED T AXIS, ECG11: 47 DEGREES
DIAGNOSIS, 93000: NORMAL
P-R INTERVAL, ECG05: 128 MS
Q-T INTERVAL, ECG07: 362 MS
QRS DURATION, ECG06: 82 MS
QTC CALCULATION (BEZET), ECG08: 440 MS
VENTRICULAR RATE, ECG03: 89 BPM
XX-LABCORP SPECIMEN COL,LCBCF: NORMAL

## 2022-09-15 PROCEDURE — 93005 ELECTROCARDIOGRAM TRACING: CPT

## 2022-09-15 PROCEDURE — 99001 SPECIMEN HANDLING PT-LAB: CPT

## 2022-09-19 ENCOUNTER — OFFICE VISIT (OUTPATIENT)
Dept: SURGERY | Age: 47
End: 2022-09-19
Payer: MEDICAID

## 2022-09-19 VITALS
OXYGEN SATURATION: 100 % | SYSTOLIC BLOOD PRESSURE: 139 MMHG | HEIGHT: 66 IN | DIASTOLIC BLOOD PRESSURE: 75 MMHG | BODY MASS INDEX: 44.11 KG/M2 | TEMPERATURE: 97.1 F | WEIGHT: 274.5 LBS | HEART RATE: 78 BPM

## 2022-09-19 DIAGNOSIS — Z87.19 HX OF VENTRAL HERNIA REPAIR: ICD-10-CM

## 2022-09-19 DIAGNOSIS — M19.90 ARTHRITIS: ICD-10-CM

## 2022-09-19 DIAGNOSIS — E66.01 MORBID OBESITY WITH BMI OF 40.0-44.9, ADULT (HCC): ICD-10-CM

## 2022-09-19 DIAGNOSIS — E66.01 MORBID OBESITY (HCC): Primary | ICD-10-CM

## 2022-09-19 DIAGNOSIS — K21.9 GASTROESOPHAGEAL REFLUX DISEASE, UNSPECIFIED WHETHER ESOPHAGITIS PRESENT: ICD-10-CM

## 2022-09-19 DIAGNOSIS — Z98.890 HX OF VENTRAL HERNIA REPAIR: ICD-10-CM

## 2022-09-19 PROBLEM — G56.00 CARPAL TUNNEL SYNDROME: Status: ACTIVE | Noted: 2022-09-19

## 2022-09-19 PROBLEM — K58.9 IBS (IRRITABLE BOWEL SYNDROME): Status: ACTIVE | Noted: 2022-09-19

## 2022-09-19 PROBLEM — G43.909 MIGRAINES: Status: ACTIVE | Noted: 2022-09-19

## 2022-09-19 PROBLEM — K59.00 CONSTIPATION: Status: ACTIVE | Noted: 2022-09-19

## 2022-09-19 PROCEDURE — 99205 OFFICE O/P NEW HI 60 MIN: CPT | Performed by: NURSE PRACTITIONER

## 2022-09-19 RX ORDER — DIPHENHYDRAMINE HCL 50 MG
50 CAPSULE ORAL AS NEEDED
COMMUNITY
Start: 2020-12-23 | End: 2022-09-20

## 2022-09-19 RX ORDER — OMEPRAZOLE 40 MG/1
40 CAPSULE, DELAYED RELEASE ORAL 2 TIMES DAILY
COMMUNITY
Start: 2021-10-06

## 2022-09-19 RX ORDER — ALBUTEROL SULFATE 90 UG/1
2 AEROSOL, METERED RESPIRATORY (INHALATION) AS NEEDED
COMMUNITY
Start: 2022-01-07

## 2022-09-19 RX ORDER — FAMOTIDINE 40 MG/1
40 TABLET, FILM COATED ORAL DAILY
COMMUNITY
Start: 2021-07-12

## 2022-09-19 NOTE — PROGRESS NOTES
Bariatric Surgery Consultation    Subjective: The patient is a 55 y.o. obese female with a Body mass index is 44.98 kg/m². .  The patient is at her heaviest weight for the past several years. she has been overweight since having children. she has been considering surgery since 2015. she desires surgery at this time because of multiple health concerns and their lifestyle issues which are hindered by their weight. she has been referred by her family physician Dr Rebekah Kirkpatrick for evaluation and treatment of their obesity via surgical intervention. Bryan Hatch has tried multiple diets in her lifetime most recently tried behavior modification and unsupervised diets    Bariatric comorbidities present are   Patient Active Problem List   Diagnosis Code    GERD (gastroesophageal reflux disease) K21.9    H. pylori infection A04.8    Hx of ventral hernia repair Z98.890, Z87.19    IBS (irritable bowel syndrome) K58.9    Arthritis M19.90    Migraines G43.909    Carpal tunnel syndrome G56.00    Morbid obesity ([address] E66.01    Morbid obesity with BMI of 40.0-44.9, adult ([address] E66.01, Z68.41    Constipation K59.00       The patient is considering laparoscopic sleeve gastrectomy for surgical weight loss due to their ineffective progress with medical forms of weight loss and the urging of their physician who cares for their primary medical issues. The patient  now presents  for consideration for weight loss surgery understanding the benefits of this over a medical approach of weight loss as was discussed in our presentation on weight loss surgery. They have discussed their plans both with their family and primary care physician who is in support of their pursuit of such. The patient has not had health issues as of late and denies and gastrointestinal disturbances other than what is outlined below in their review of symptoms.  All of their prior evaluations available by both their PCP's and specialists physicians have been reviewed today either in the Care Everywhere portal or scan noted under the media tab. I have spent a large portion of my initial consultation today reviewing the patients current dietary habits which have contributed to their health issues and obesity. I have suggested to them personally a dietary regimen that they can initiate now to help with their status as it pertains to their weight. They understand that the most important aspect of their journey through their weight loss endeavor will be their adherence to a new lifestyle of healthy eating behavior. They also understand that an adherence to an exercise program will not only help with weight loss but is ultimately important in weight maintenance. The patients goal weight is 170 lb. These goals are consistent with expected outcomes of their desired operation. her Medical goals are resolution of these health issues.         Patient Active Problem List    Diagnosis Date Noted    GERD (gastroesophageal reflux disease) 2022    Hx of ventral hernia repair 2022    IBS (irritable bowel syndrome) 2022    Arthritis 2022    Migraines 2022    Carpal tunnel syndrome 2022    Morbid obesity ([address] 2022    Morbid obesity with BMI of 40.0-44.9, adult ([address] 2022    Constipation 2022    H. pylori infection      Past Surgical History:   Procedure Laterality Date    HX ACL RECONSTRUCTION      HX  SECTION      HX HERNIA REPAIR  2019    incarcerated ventral with SBO    HX HERNIA REPAIR  2020    recurrent ventral hernia repair with size 8 circumferential string mesh    HX HYSTERECTOMY  2013    HX LAP CHOLECYSTECTOMY  2014    HX TOTAL COLECTOMY      HX TUBAL LIGATION  2004      Social History     Tobacco Use    Smoking status: Never    Smokeless tobacco: Never   Substance Use Topics    Alcohol use: Yes     Comment: occ weekends      Family History   Problem Relation Age of Onset    Sleep Apnea Mother     Asthma Mother     Breast Cancer Mother     Stroke Mother     Hypertension Mother     Asthma Father     COPD Father     Hypertension Father     Asthma Sister       Current Outpatient Medications   Medication Sig Dispense Refill    omeprazole (PRILOSEC) 40 mg capsule       famotidine (PEPCID) 40 mg tablet Take 40 mg by mouth. albuterol (PROVENTIL HFA, VENTOLIN HFA, PROAIR HFA) 90 mcg/actuation inhaler       diphenhydrAMINE (BENADRYL) 50 mg capsule Take 50 mg by mouth as needed. ondansetron hcl (ZOFRAN) 4 mg tablet Take 4 mg by mouth every eight (8) hours as needed for Nausea or Vomiting.      lubiPROStone (AMITIZA) 8 mcg capsule Take 8 mcg by mouth daily as needed for Constipation. acetaminophen (TYLENOL) 325 mg tablet Take 325 mg by mouth every four (4) hours as needed for Pain.      esomeprazole (NEXIUM) 40 mg capsule Take 40 mg by mouth two (2) times a day. (Patient not taking: Reported on 9/19/2022)      cyclobenzaprine (FLEXERIL) 10 mg tablet Take 10 mg by mouth three (3) times daily as needed for Muscle Spasm(s).  (Patient not taking: Reported on 9/19/2022)       Allergies   Allergen Reactions    Iodinated Contrast Media Anaphylaxis    Nsaids (Non-Steroidal Anti-Inflammatory Drug) Diarrhea and Nausea and Vomiting    Promethazine Itching          Review of Systems:       General - No history or complaints of unexpected fever, chills, or weight loss  Head/Neck - No history or complaints of headache, diplopia, dysphagia, hearing loss  Cardiac - No history or complaints of chest pain, palpitations, murmur, or shortness of breath  Pulmonary - No history or complaints of shortness of breath, productive cough, hemoptysis  Gastrointestinal - severe reflux requiring PPI and H2 blocker,no  abdominal pain, obstipation/constipation or blood per rectum  Genitourinary - No history or complaints of hematuria/dysuria, stress urinary incontinence symptoms, or renal lithiasis  Musculoskeletal - has joint pain in their knees,  no muscular weakness  Hematologic - No history or complaints of bleeding disorders,  No blood transfusions  Neurologic - No history or complaints of  migraine headaches, seizure activity, syncopal episodes, TIA or stroke  Integumentary - No history or complaints of rashes, abnormal nevi, skin cancer  Gynecological - No abnormal bleeding or irregular menses               Objective:   Visit Vitals  /75 (BP 1 Location: Left upper arm, BP Patient Position: Sitting, BP Cuff Size: Large adult)   Pulse 78   Temp 97.1 °F (36.2 °C)   Ht 5' 5.5\" (1.664 m)   Wt 124.5 kg (274 lb 8 oz)   SpO2 100%   BMI 44.98 kg/m²       Physical Examination: General appearance - alert, well appearing, and in no distress  Mental status - alert, oriented to person, place, and time  Eyes - pupils equal and reactive, extraocular eye movements intact  Neck - supple, no significant adenopathy  Lymphatics - no palpable lymphadenopathy, no hepatosplenomegaly  Chest - clear to auscultation, no wheezes, rales or rhonchi, symmetric air entry  Heart - normal rate, regular rhythm, normal S1, S2, no murmurs, rubs, clicks or gallops  Abdomen - soft, nontender, nondistended, no masses or organomegaly;  scar below umbilicus  Back exam - full range of motion, no tenderness, palpable spasm or pain on motion  Neurological - alert, oriented, normal speech, no focal findings or movement disorder noted  Musculoskeletal - no joint tenderness, deformity or swelling  Extremities - peripheral pulses normal, no pedal edema, no clubbing or cyanosis  Skin - normal coloration and turgor, no rashes, no suspicious skin lesions noted    Labs:       Recent Results (from the past 1440 hour(s))   EKG, 12 LEAD, INITIAL    Collection Time: 09/15/22  2:33 PM   Result Value Ref Range    Ventricular Rate 89 BPM    Atrial Rate 89 BPM    P-R Interval 128 ms    QRS Duration 82 ms    Q-T Interval 362 ms    QTC Calculation (Bezet) 440 ms    Calculated P Axis 61 degrees Calculated R Axis 67 degrees    Calculated T Axis 47 degrees    Diagnosis       Normal sinus rhythm  Nonspecific T wave abnormality  Abnormal ECG  Confirmed by Rafaela Bailey MD, Gracy ([id number]) on 9/15/2022 11:14:30 PM     [facility] SPECIMEN COL    Collection Time: 09/15/22  2:38 PM   Result Value Ref Range    [facility]. Specimens collected/sent to [facility]. Please direct inquiries to ([phone]). [place] upper GI w double contrast w small bowel follow through 8/4/22  Impression    1. Mild esophageal dysmotility with intraesophageal reflux seen at all levels. 2.  Mild gastroesophageal reflux. 3.  Increased transit time for contrast to reach the colon without evidence of small bowel obstruction. 05/18/2017: EGD:  1. Z-line regular, 37 cm from the incisors. 2. Normal esophagus. 3. Normal stomach. 4. Normal duodenal bulb, first portion of the duodenum and second portion of the duodenum. 5. Biopsies were not obtained secondary to patients excessive discomfort during the procedure. 6. No specimens collected. 7. No pathology seen to explain patient's symptoms    Ventral Hernia Repair with Mesh 8/2020  PROCEDURE: Patient seen in preoperative holding. The plan of care was reviewed in detail with the patient. She demonstrated comprehension. Consent was obtained and witnessed. Patient was brought to the operating room. Her abdomen was cleaned, prepped and draped in usual fashion. A midline incision was made directly over the region of palpable concern. Carefully dissected down such that the hernia itself was identified. The defect itself measured approximately 6 cm in its largest dimension and the fascial edges were freed up. A size 8 circumferential string mesh was then used and fixated circumferentially using interrupted Prolene sutures and covered the defect in its entirety with adequate overlap. The overlying defect was then closed with a looped PDS. The abdomen was then copiously irrigated. Interrupted Vicryl suture was used to approximate the dead space. An Insorb stapler was then used to approximate the skin and a ALANA dressing was applied on the skin. The patient tolerated the procedure well without any acute complications. Assessment:     Morbid obesity with comorbidity    Plan:     laparoscopic gastric bypass surgery    This is a 55 y.o. female with a BMI of Body mass index is 44.98 kg/m². and the weight-related co-morbidties as noted below. Jayy meets the NIH criteria for bariatric surgery based upon the BMI of Body mass index is 44.98 kg/m². and multiple weight-related co-morbidties. Jewell Mathis has elected elected laparoscopic gastric bypass as her intervention of choice for treatment of morbid obestiy through surgical means secondary to its uniform results,  profound baseline suppression of hunger and pace at which weight is lost.    In the office today, following Ms Cullen's history and physical examination, a 30 minute discussion regarding the anatomic alterations for the laparoscopic gastric bypass  was undertaken. The dietary expectations and the patient  dependent factors for success were thoroughly discussed, to include the need for interval follow-up and long-term dietary changes associated with success. The possible short and long term  complications of the gastric bypass were also discussed, to include but not limited to;death, DVT/PE, staple line leak, bleeding, stricture formation, infection,internal hernia  and pouch dilation. Specific weight related outcomes for success were also discussed with an emphasis on careful and close follow-up with the first year and dietary behavior modification over the first years as baseline cyclical hunger returns  The patient expressed an understanding of the above factors, and @HIS@ questions were answered in their entirety.     In addition, the patient attended a 1.5 hour power point seminar regarding obesity, surgical weight loss including, adjustable gastric band, gastric bypass, and sleeve gastrectomy. This discussion contrasted the different surgical techniques, mechanisms of actions and expected outcomes, and surgical and medical risks associated with each procedure. Today, the patient had all of her questions answered and desires to proceed with  bariatric surgery initially choosing the gastric bypass as her surgical option. We had a greater than 15 minute discussion regarding her extent of reflux, requiring 2 medications, and demonstrated on recent fluoroscopy at several levels, that gastric bypass is the preferred choice of surgery for her. However, since we do not know the level of intraabdominal adhesions due to 2 previous hernia repairs (1 with mesh), we may have to be prepared for sleeve gastrectomy. Secondary Diagnoses:     Dietary Intervention  - The patient is currently scheduled to see or has been followed by a bariatric nutritionist for an attempt at preoperative weight loss as has been dictated by their insurance carrier. They will be assessed at various times during their follow up to evaluate their progress depending on the length of time that is required once again by their carrier. I have explained the importance of preoperative weight loss and the benefits regarding lower surgical risk and also assisting the patient in reaching their weight loss goal.  Finally they understand there is a physiologic benefit from the standpoint of hepatic volume reduction and reduction of central visceral adiposity preoperatively. I have reiterated the importance of a low carbohydrate and high protein regimen to achieve their stated goal. I have reviewed their current eating behavior prior to this encounter and explained to them in an exhaustive fashion the appropriate diet that they should adhere to.  They have been encouraged to loose weight pre operatively and understand it is our prerogative to cancel surgery or postpone their procedure in the event of significant weight gain. The patients weight loss goal pre operatively is 5-10 pounds. GERD -The patient understands that weight loss surgery is not a guaranteed cure for reflux disease but does understand the benefits that weight loss can have on reflux disease. They also understand that at the time of surgery the gastroesophageal junction will be evaluated for the presence of a diaphragmatic hernia. Hernias will be corrected always with the gastric band and sleeve gastrectomy procedures, but only on a case by case basis with the gastric bypass. The patient also understands that neither weight loss surgery nor repair of a diaphragmatic hernia repair guarantees the complete cessation of the disease. Weight Related Arthritis -The patient understands the benefits that weight loss surgery can have on their arthritis but also understands that weight loss is not a guaranteed cure and relief of symptoms is often dependent on the severity of the underlying disease. The patient also understands that traditional pharmaceutical treatments for this diagnosis are usually unavailable to post-operative weight loss patients due to the effects on the gastrointestinal tract. Any changes to the patients medication treatment will ultimately be made the patients PCP with input by our office. I spent a total of 60 minutes providing this service to the patient today to include discussing their surgical choice, reviewing their work-up to date, and performing a full history and physical examination.   Signed By: Cody George NP     September 19, 2022

## 2022-09-20 ENCOUNTER — HOSPITAL ENCOUNTER (OUTPATIENT)
Dept: PREADMISSION TESTING | Age: 47
Discharge: HOME | End: 2022-09-20

## 2022-09-20 VITALS — BODY MASS INDEX: 44.03 KG/M2 | WEIGHT: 274 LBS | HEIGHT: 66 IN

## 2022-09-20 RX ORDER — SODIUM CHLORIDE, SODIUM LACTATE, POTASSIUM CHLORIDE, CALCIUM CHLORIDE 600; 310; 30; 20 MG/100ML; MG/100ML; MG/100ML; MG/100ML
125 INJECTION, SOLUTION INTRAVENOUS CONTINUOUS
Status: CANCELLED | OUTPATIENT
Start: 2022-09-20

## 2022-09-20 NOTE — PERIOP NOTES
PAT - SURGICAL PRE-ADMISSION INSTRUCTIONS    NAME:  Jayy Cullen                                                          TODAY'S DATE:  9/20/2022    SURGERY DATE:  9/26/2022                                  SURGERY ARRIVAL TIME:   TBA    Do NOT eat or drink anything, including candy or gum, after MIDNIGHT on 9/26/2022 , unless you have specific instructions from your Surgeon or Anesthesia Provider to do so. No smoking 24 hours before surgery. No alcohol 24 hours prior to the day of surgery. No recreational drugs for one week prior to the day of surgery. Leave all valuables, including money/purse, at home. Remove all jewelry, nail polish, makeup (including mascara); no lotions, powders, deodorant, or perfume/cologne/after shave. Glasses/Contact lenses and Dentures may be worn to the hospital.  They will be removed prior to surgery. Call your doctor if symptoms of a cold or illness develop within 24 ours prior to surgery. AN ADULT MUST DRIVE YOU HOME AFTER OUTPATIENT SURGERY. If you are having an OUTPATIENT procedure, please make arrangements for a responsible adult to be with you for 24 hours after your surgery. If you are admitted to the hospital, you will be assigned to a bed after surgery is complete. Normally a family member will not be able to see you until you are in your assigned bed. 12. Visitation Restrictions Explained. Special Instructions:  Covid Test not needed Patient not vaccinated, Quarantine requirements discussed  No Advanced Directive or DNR  Bring inhaler. , Take these medications the morning of surgery with a sip of water:  famotidine,omeprazole    Patient Prep:    shower with anti-bacterial soap three nights prior to surgery     These surgical instructions were reviewed with patient during the PAT  phone call

## 2022-09-22 ENCOUNTER — HOSPITAL ENCOUNTER (OUTPATIENT)
Dept: BARIATRICS/WEIGHT MGMT | Age: 47
Discharge: HOME | End: 2022-09-22

## 2022-09-22 VITALS — BODY MASS INDEX: 43.57 KG/M2 | WEIGHT: 271.1 LBS | HEIGHT: 66 IN

## 2022-09-22 NOTE — PROGRESS NOTES
Medical Weight Loss Multi-Disciplinary Program    Patient's Name: Mo Flor Age: 55 y.o. YOB: 1975 Sex: female     Session #1. Pt attended in-person class. Weight obtained in office. Date: 9/22/2022    Visit Vitals  Ht 5' 5.5\" (1.664 m)   Wt 123 kg (271 lb 1.6 oz)   BMI 44.43 kg/m²       Pounds Lost since last month: N/A Pounds Gained since last month: N/A       Starting Weight (Initial consult, 9/19/22): 274.5 lbs Previous Months Weight: N/A   Overall Pounds Lost: 3.4 lbs  Overall Pounds Gained: 0 lbs      Note that pt has a pre-operative weight loss goal of 5-10 lbs. Pt has not met this goal.      Do you smoke? No    Alcohol intake:  Number of drinks per week: 2-4 EVERY OTHER WEEKEND    Class Guidelines    Guidelines are reviewed with patient at the start of every class. 1. Patient understands that weight loss trial classes must be consecutive. Patient understands if they miss a class, it is their responsibility to contact me to reschedule class. I will reach out to patient after their first no show. 2.  Patient understands the expectations that weight maintenance/weight loss is expected during the classes. Failure to demonstrate changes may result in extension of weight loss trial, followed by returning to see the surgeon. Patient understands that they CANNOT gain any weight during the weight loss trial.  Gaining weight will result in extension of weight loss trial.  3. Patient is also instructed to complete their labwork, psychological evaluation visit, and any other tests that the surgeon has ordered while they are working on their weight loss trial.  4.  Patient was instructed to bring their packet of nutrition education materials to every class and appointment.         Eating Habits and Behaviors    Reviewed intake  Understanding low carbohydrates, low sugar, higher protein meals  Instruction given for personal dietary changes  Discussed perceived compliance    Comments: SALLIE Reviewed Diet History and Physical Activity/Exercise habits. Recommended dietary changes discussed for both before and after surgery. Today in class we reviewed the Key Diet Principles. Patient was encouraged to consume 3 meals each day, and meal timing was reviewed. Meal time behaviors that will help pt to be successful with their weight loss efforts were also discussed. We discussed the importance of drinking adequate amounts of fluids, recommending that patient consume a minimum of 64 oz of sugar-free, caffeine-free and carbonation-free fluids each day. Patient was encouraged to eliminate sugar-sweetened beverages such as sweet tea, fruit juice, fruit smoothies, flavored coffee drinks and regular sodas. During the weight loss trial, patient is encouraged to focus on eating protein-forward meals, with a daily goal of  grams protein. Patient was also advised to decrease carbohydrate intake to <100 g/d, choosing complex vs. simple carbohydrates in limited amounts. We also discussed limiting fat intake. Encouraged patient to follow the \"3-gram rule\" when choosing foods by looking for items containing <3 g of fat and sugar per serving. We reviewed meal planning guidelines and discussed appropriate meal and snack options. We also talked about appropriate protein drinks and patient was encouraged to start trying these, using them either for a meal replacement or a protein-rich snack pre-operatively. We reviewed the nutritional guidelines for selecting protein shakes. Pre-operative vitamin and mineral supplementation was reviewed. Patient was instructed to choose a chewable complete multivitamin with iron in NON-gummy form. Selection of probiotic was also reviewed. Comments: During today's class we talked about the role of carbohydrates. Patient was instructed on: The function of carbohydrates. Foods that are carbohydrate-heavy.   Patient was given the guidelines to keep their carbohydrates less than 50-75 grams per day in the pre-op phase. Patient was also given ideas of low carb swaps, which include zucchini noodles, spaghetti squash, or cauliflower rice. Discussed lower carb swaps to use instead of potato chips. Patient's current diet habits include:  Patient is eating 2-3 meals and 2-3 snacks per day. Per dietary recall, pts diet has the following concerns:  Skipping lunch 2-3x/wk  High carbohydrate intake (juice, breads, cereal, fruits, breaded foods, rice, sweets - pie, candy bar)  Drinking 48+ oz/d sugar-sweetened beverages (soda, juice)  Inadequate water intake (64 oz EVERY OTHER day)  Pt has found 0 protein supplement shakes for use post-op in meeting their protein needs. Patient's plan for dietary and/or behavior changes in the upcoming month: none noted    Physical Activity/Exercise    Comments: We talked about exercise. Patient was given reasons of why exercise is so important and how that can help with their long-term success. I have encouraged patient to get a support system to help with the activity. We talked about recommended types of physical activity, including walking, swimming, cycling, or chair exercises. I also talked with patient about doing some strength training, which helps the metabolism, as well as strengthen muscles and bones. Patient's plan to incorporate more activity includes: \"try to walk more, parking far away and try not to use handicap parking as much. \"      Behavior Modification     Comments:  During today's class, we discussed the benefits of regular physical activity. Specific guidelines for cardiovascular exercise and resistance/strength training were reviewed, as well as appropriate types of physical activity.   Patient was directed to the handout, \"Overcoming Barriers to Exercise\", where we reviewed the importance of making physical activity part of a healthy lifestyle rather than just a way of meeting a weight loss goal.  We also discussed  for specific tips on fitting physical activity in when patients feel they are too busy to exercise and during inclement weather, as well as ideas for low/no cost exercise, and ways to exercise despite physical limitations. Patient was instructed to discuss any physical limitations with their healthcare provider. A list of ways to facilitate regular physical activity was reviewed, as well as how to get started with a regular physical activity regimen. Goals: Work to increase to 3-4 small meals per day, with 1-2 planned snacks as needed. Recommend following the plate method for meal planning - focusing on lean protein, non-starchy vegetables, and measured amounts of complex carbohydrates. - Goal of  g protein and <100 g carbohydrates per day. - Select at least 2 DIFFERENT protein supplements that can be used for protein supplementation to meet goals pre- and post-operatively. - Practice Carbohydrate Counting and label reading.   - Follow 3 g rule for fat and sugar. 2. Slow down meals  - Chew each bite 25-35 times. - Aim for 20-30 min/meal.  3. Increase non-caloric fluids to 64 oz per day. Eliminate caffeine, added sugar, carbonation, and straws.               - Continue to work to decrease sugar sweetened beverages - goal of calorie-free beverages only. - Must eliminate caffeine prior to surgery and avoid for ~6-8 weeks. - Practice 30:30 rule,  food and fluid intake. 4. Start activity regimen, work to increase ADLs. 5. Start Complete MVI and probiotic at least 30 days pre-op. Candidate for surgery (per RD): PENDING, Pt scheduled for nutrition education on 10/20/22.

## 2022-09-26 ENCOUNTER — ANESTHESIA (OUTPATIENT)
Dept: SURGERY | Age: 47
End: 2022-09-26
Payer: MEDICAID

## 2022-09-26 ENCOUNTER — HOSPITAL ENCOUNTER (OUTPATIENT)
Age: 47
Setting detail: OUTPATIENT SURGERY
Discharge: HOME | End: 2022-09-26
Attending: ORTHOPAEDIC SURGERY | Admitting: ORTHOPAEDIC SURGERY
Payer: MEDICAID

## 2022-09-26 ENCOUNTER — ANESTHESIA EVENT (OUTPATIENT)
Dept: SURGERY | Age: 47
End: 2022-09-26
Payer: MEDICAID

## 2022-09-26 VITALS
DIASTOLIC BLOOD PRESSURE: 80 MMHG | TEMPERATURE: 97.9 F | BODY MASS INDEX: 43.47 KG/M2 | OXYGEN SATURATION: 99 % | HEIGHT: 66 IN | WEIGHT: 270.5 LBS | RESPIRATION RATE: 14 BRPM | SYSTOLIC BLOOD PRESSURE: 132 MMHG | HEART RATE: 72 BPM

## 2022-09-26 DIAGNOSIS — G56.01 CARPAL TUNNEL SYNDROME OF RIGHT WRIST: Primary | ICD-10-CM

## 2022-09-26 PROCEDURE — 74011000250 HC RX REV CODE- 250: Performed by: NURSE ANESTHETIST, CERTIFIED REGISTERED

## 2022-09-26 PROCEDURE — 2709999900 HC NON-CHARGEABLE SUPPLY: Performed by: ORTHOPAEDIC SURGERY

## 2022-09-26 PROCEDURE — 77030040361 HC SLV COMPR DVT MDII -B: Performed by: ORTHOPAEDIC SURGERY

## 2022-09-26 PROCEDURE — 76210000006 HC OR PH I REC 0.5 TO 1 HR: Performed by: ORTHOPAEDIC SURGERY

## 2022-09-26 PROCEDURE — 76010000138 HC OR TIME 0.5 TO 1 HR: Performed by: ORTHOPAEDIC SURGERY

## 2022-09-26 PROCEDURE — 74011000258 HC RX REV CODE- 258: Performed by: ORTHOPAEDIC SURGERY

## 2022-09-26 PROCEDURE — 77030002916 HC SUT ETHLN J&J -A: Performed by: ORTHOPAEDIC SURGERY

## 2022-09-26 PROCEDURE — 74011250636 HC RX REV CODE- 250/636: Performed by: STUDENT IN AN ORGANIZED HEALTH CARE EDUCATION/TRAINING PROGRAM

## 2022-09-26 PROCEDURE — 74011000250 HC RX REV CODE- 250: Performed by: ORTHOPAEDIC SURGERY

## 2022-09-26 PROCEDURE — 77030012508 HC MSK AIRWY LMA AMBU -A: Performed by: ANESTHESIOLOGY

## 2022-09-26 PROCEDURE — 77030020782 HC GWN BAIR PAWS FLX 3M -B: Performed by: ORTHOPAEDIC SURGERY

## 2022-09-26 PROCEDURE — 76060000032 HC ANESTHESIA 0.5 TO 1 HR: Performed by: ORTHOPAEDIC SURGERY

## 2022-09-26 PROCEDURE — 74011250636 HC RX REV CODE- 250/636: Performed by: NURSE ANESTHETIST, CERTIFIED REGISTERED

## 2022-09-26 PROCEDURE — 74011250636 HC RX REV CODE- 250/636: Performed by: ORTHOPAEDIC SURGERY

## 2022-09-26 PROCEDURE — 76210000020 HC REC RM PH II FIRST 0.5 HR: Performed by: ORTHOPAEDIC SURGERY

## 2022-09-26 RX ORDER — ALBUTEROL SULFATE 0.83 MG/ML
2.5 SOLUTION RESPIRATORY (INHALATION)
Status: DISCONTINUED | OUTPATIENT
Start: 2022-09-26 | End: 2022-09-26 | Stop reason: HOSPADM

## 2022-09-26 RX ORDER — FENTANYL CITRATE 50 UG/ML
50 INJECTION, SOLUTION INTRAMUSCULAR; INTRAVENOUS
Status: DISCONTINUED | OUTPATIENT
Start: 2022-09-26 | End: 2022-09-26 | Stop reason: HOSPADM

## 2022-09-26 RX ORDER — PROPOFOL 10 MG/ML
INJECTION, EMULSION INTRAVENOUS AS NEEDED
Status: DISCONTINUED | OUTPATIENT
Start: 2022-09-26 | End: 2022-09-26 | Stop reason: HOSPADM

## 2022-09-26 RX ORDER — FENTANYL CITRATE 50 UG/ML
INJECTION, SOLUTION INTRAMUSCULAR; INTRAVENOUS AS NEEDED
Status: DISCONTINUED | OUTPATIENT
Start: 2022-09-26 | End: 2022-09-26 | Stop reason: HOSPADM

## 2022-09-26 RX ORDER — MIDAZOLAM HYDROCHLORIDE 1 MG/ML
INJECTION, SOLUTION INTRAMUSCULAR; INTRAVENOUS AS NEEDED
Status: DISCONTINUED | OUTPATIENT
Start: 2022-09-26 | End: 2022-09-26 | Stop reason: HOSPADM

## 2022-09-26 RX ORDER — GLYCOPYRROLATE 0.2 MG/ML
INJECTION INTRAMUSCULAR; INTRAVENOUS AS NEEDED
Status: DISCONTINUED | OUTPATIENT
Start: 2022-09-26 | End: 2022-09-26 | Stop reason: HOSPADM

## 2022-09-26 RX ORDER — SODIUM CHLORIDE, SODIUM LACTATE, POTASSIUM CHLORIDE, CALCIUM CHLORIDE 600; 310; 30; 20 MG/100ML; MG/100ML; MG/100ML; MG/100ML
125 INJECTION, SOLUTION INTRAVENOUS CONTINUOUS
Status: DISCONTINUED | OUTPATIENT
Start: 2022-09-26 | End: 2022-09-26 | Stop reason: HOSPADM

## 2022-09-26 RX ORDER — NALOXONE HYDROCHLORIDE 0.4 MG/ML
0.04 INJECTION, SOLUTION INTRAMUSCULAR; INTRAVENOUS; SUBCUTANEOUS
Status: DISCONTINUED | OUTPATIENT
Start: 2022-09-26 | End: 2022-09-26 | Stop reason: HOSPADM

## 2022-09-26 RX ORDER — SODIUM CHLORIDE, SODIUM LACTATE, POTASSIUM CHLORIDE, CALCIUM CHLORIDE 600; 310; 30; 20 MG/100ML; MG/100ML; MG/100ML; MG/100ML
50 INJECTION, SOLUTION INTRAVENOUS CONTINUOUS
Status: DISCONTINUED | OUTPATIENT
Start: 2022-09-26 | End: 2022-09-26 | Stop reason: HOSPADM

## 2022-09-26 RX ORDER — LIDOCAINE HYDROCHLORIDE 20 MG/ML
INJECTION, SOLUTION EPIDURAL; INFILTRATION; INTRACAUDAL; PERINEURAL AS NEEDED
Status: DISCONTINUED | OUTPATIENT
Start: 2022-09-26 | End: 2022-09-26 | Stop reason: HOSPADM

## 2022-09-26 RX ORDER — HYDROCODONE BITARTRATE AND ACETAMINOPHEN 5; 325 MG/1; MG/1
1 TABLET ORAL
Qty: 12 TABLET | Refills: 0 | Status: SHIPPED | OUTPATIENT
Start: 2022-09-26 | End: 2022-09-29

## 2022-09-26 RX ORDER — DEXAMETHASONE SODIUM PHOSPHATE 4 MG/ML
INJECTION, SOLUTION INTRA-ARTICULAR; INTRALESIONAL; INTRAMUSCULAR; INTRAVENOUS; SOFT TISSUE AS NEEDED
Status: DISCONTINUED | OUTPATIENT
Start: 2022-09-26 | End: 2022-09-26 | Stop reason: HOSPADM

## 2022-09-26 RX ORDER — DIPHENHYDRAMINE HYDROCHLORIDE 50 MG/ML
12.5 INJECTION, SOLUTION INTRAMUSCULAR; INTRAVENOUS
Status: DISCONTINUED | OUTPATIENT
Start: 2022-09-26 | End: 2022-09-26 | Stop reason: HOSPADM

## 2022-09-26 RX ORDER — SODIUM CHLORIDE 0.9 % (FLUSH) 0.9 %
5-40 SYRINGE (ML) INJECTION EVERY 8 HOURS
Status: DISCONTINUED | OUTPATIENT
Start: 2022-09-26 | End: 2022-09-26 | Stop reason: HOSPADM

## 2022-09-26 RX ORDER — NALBUPHINE HYDROCHLORIDE 10 MG/ML
5 INJECTION, SOLUTION INTRAMUSCULAR; INTRAVENOUS; SUBCUTANEOUS
Status: DISCONTINUED | OUTPATIENT
Start: 2022-09-26 | End: 2022-09-26 | Stop reason: HOSPADM

## 2022-09-26 RX ORDER — SODIUM CHLORIDE 0.9 % (FLUSH) 0.9 %
5-40 SYRINGE (ML) INJECTION AS NEEDED
Status: DISCONTINUED | OUTPATIENT
Start: 2022-09-26 | End: 2022-09-26 | Stop reason: HOSPADM

## 2022-09-26 RX ORDER — ONDANSETRON 2 MG/ML
4 INJECTION INTRAMUSCULAR; INTRAVENOUS ONCE
Status: DISCONTINUED | OUTPATIENT
Start: 2022-09-26 | End: 2022-09-26 | Stop reason: HOSPADM

## 2022-09-26 RX ORDER — HYDROMORPHONE HYDROCHLORIDE 1 MG/ML
0.5 INJECTION, SOLUTION INTRAMUSCULAR; INTRAVENOUS; SUBCUTANEOUS AS NEEDED
Status: DISCONTINUED | OUTPATIENT
Start: 2022-09-26 | End: 2022-09-26 | Stop reason: HOSPADM

## 2022-09-26 RX ORDER — ONDANSETRON 2 MG/ML
INJECTION INTRAMUSCULAR; INTRAVENOUS AS NEEDED
Status: DISCONTINUED | OUTPATIENT
Start: 2022-09-26 | End: 2022-09-26 | Stop reason: HOSPADM

## 2022-09-26 RX ADMIN — MIDAZOLAM 2 MG: 1 INJECTION INTRAMUSCULAR; INTRAVENOUS at 14:18

## 2022-09-26 RX ADMIN — FENTANYL CITRATE 50 MCG: 50 INJECTION, SOLUTION INTRAMUSCULAR; INTRAVENOUS at 15:15

## 2022-09-26 RX ADMIN — LIDOCAINE HYDROCHLORIDE 80 MG: 20 INJECTION, SOLUTION INTRAVENOUS at 14:23

## 2022-09-26 RX ADMIN — FENTANYL CITRATE 50 MCG: 50 INJECTION, SOLUTION INTRAMUSCULAR; INTRAVENOUS at 14:37

## 2022-09-26 RX ADMIN — FENTANYL CITRATE 25 MCG: 50 INJECTION, SOLUTION INTRAMUSCULAR; INTRAVENOUS at 14:23

## 2022-09-26 RX ADMIN — FENTANYL CITRATE 25 MCG: 50 INJECTION, SOLUTION INTRAMUSCULAR; INTRAVENOUS at 14:28

## 2022-09-26 RX ADMIN — FENTANYL CITRATE 50 MCG: 50 INJECTION, SOLUTION INTRAMUSCULAR; INTRAVENOUS at 15:26

## 2022-09-26 RX ADMIN — GLYCOPYRROLATE 0.2 MG: 0.2 INJECTION INTRAMUSCULAR; INTRAVENOUS at 14:18

## 2022-09-26 RX ADMIN — PROPOFOL 200 MG: 10 INJECTION, EMULSION INTRAVENOUS at 14:23

## 2022-09-26 RX ADMIN — Medication 3 G: at 14:18

## 2022-09-26 RX ADMIN — DEXAMETHASONE SODIUM PHOSPHATE 6 MG: 4 INJECTION, SOLUTION INTRAMUSCULAR; INTRAVENOUS at 14:23

## 2022-09-26 RX ADMIN — ONDANSETRON HYDROCHLORIDE 4 MG: 2 INJECTION INTRAMUSCULAR; INTRAVENOUS at 14:23

## 2022-09-26 RX ADMIN — SODIUM CHLORIDE, SODIUM LACTATE, POTASSIUM CHLORIDE, AND CALCIUM CHLORIDE 125 ML/HR: 600; 310; 30; 20 INJECTION, SOLUTION INTRAVENOUS at 12:12

## 2022-09-26 RX ADMIN — SODIUM CHLORIDE, SODIUM LACTATE, POTASSIUM CHLORIDE, AND CALCIUM CHLORIDE: 600; 310; 30; 20 INJECTION, SOLUTION INTRAVENOUS at 14:58

## 2022-09-26 NOTE — ANESTHESIA POSTPROCEDURE EVALUATION
Post-Anesthesia Evaluation and Assessment    Cardiovascular Function/Vital Signs  Visit Vitals  /85   Pulse 83   Temp 36.6 °C (97.9 °F)   Resp 16   Ht 5' 5.5\" (1.664 m)   Wt 122.7 kg (270 lb 8 oz)   SpO2 97%   BMI 44.33 kg/m²       Patient is status post Procedure(s):  RIGHT CARPAL TUNNEL RELEASE. Nausea/Vomiting: Controlled. Postoperative hydration reviewed and adequate. Pain:  Pain Scale 1: FLACC (09/26/22 1537)  Pain Intensity 1: 0 (09/26/22 1537)   Managed. Neurological Status:   Neuro (WDL): Exceptions to WDL (09/26/22 1507)   At baseline. Mental Status and Level of Consciousness: Arousable. Pulmonary Status:   O2 Device: None (Room air) (09/26/22 1537)   Adequate oxygenation and airway patent. Complications related to anesthesia: None    Post-anesthesia assessment completed. No concerns. Patient has met all discharge requirements.     Signed By: Jese Wheeler MD    September 26, 2022               Procedure(s):  RIGHT CARPAL TUNNEL RELEASE.    general    <BSHSIANPOST>    INITIAL Post-op Vital signs:   Vitals Value Taken Time   /85 09/26/22 1546   Temp 36.6 °C (97.9 °F) 09/26/22 1546   Pulse 83 09/26/22 1546   Resp 16 09/26/22 1546   SpO2 97 % 09/26/22 1546

## 2022-09-26 NOTE — DISCHARGE INSTRUCTIONS
Post operative Instructions for Carpal Tunnel Release   1. You may remove the surgical dressing 2 days after surgery. It is okay to shower and get the incision wet at this time, but no soaking or bathing. Steri strips may be removed if necessary. You can place band-aids over the incisions and apply an Ace wrap if necessary. 2. Elevate the operative extremity and apply ice as often as possible to help reduce swelling. This also increases blood flow to help improve healing. 3. All patients should begin straight leg raises the day of surgery, approximately 30 every hour. 4. It is important to get up and walk around for 3-5 minutes every hour while you are awake. This will decrease the risk of blood clot. 5. If you develop fever of greater than 101.4 or chills, please contact our office immediately, [phone].   6. Take the pain medication as prescribed. Maximum dose of Acetaminophen in a 24 hr period is 4 grams. Larger doses can cause liver impairment. Be aware that over the counter products can contain acetaminophen. 7. Follow up in 7-10 days. Call to schedule appointment. [phone]. DISCHARGE SUMMARY from Nurse    PATIENT INSTRUCTIONS:    After general anesthesia or intravenous sedation, for 24 hours or while taking prescription Narcotics:  Limit your activities  Do not drive and operate hazardous machinery  Do not make important personal or business decisions  Do  not drink alcoholic beverages  If you have not urinated within 8 hours after discharge, please contact your surgeon on call.     Report the following to your surgeon:  Excessive pain, swelling, redness or odor of or around the surgical area  Temperature over 100.5  Nausea and vomiting lasting longer than 4 hours or if unable to take medications  Any signs of decreased circulation or nerve impairment to extremity: change in color, persistent  numbness, tingling, coldness or increase pain  Any questions    What to do at Home:  Recommended activity: Activity as tolerated and no driving for today, Ambulate in house, and No lifting, Driving, or Strenuous exercise for until you are release by your Dr Jenny Bond you experience any of the following symptoms  pain that is  not going away, excessive bleeding, any signs and symptoms of poor circulation, please follow up with . *  Please give a list of your current medications to your Primary Care Provider. *  Please update this list whenever your medications are discontinued, doses are      changed, or new medications (including over-the-counter products) are added. *  Please carry medication information at all times in case of emergency situations. These are general instructions for a healthy lifestyle:    No smoking/ No tobacco products/ Avoid exposure to second hand smoke  Surgeon General's Warning:  Quitting smoking now greatly reduces serious risk to your health. Obesity, smoking, and sedentary lifestyle greatly increases your risk for illness    A healthy diet, regular physical exercise & weight monitoring are important for maintaining a healthy lifestyle    You may be retaining fluid if you have a history of heart failure or if you experience any of the following symptoms:  Weight gain of 3 pounds or more overnight or 5 pounds in a week, increased swelling in our hands or feet or shortness of breath while lying flat in bed. Please call your doctor as soon as you notice any of these symptoms; do not wait until your next office visit. The discharge information has been reviewed with the patient and caregiver. The patient and caregiver verbalized understanding. Discharge medications reviewed with the patient and caregiver and appropriate educational materials and side effects teaching were provided.     Patient armband removed and shredded ___________________________________________________________________________________________________________________________________

## 2022-09-26 NOTE — OP NOTES
[address] Medicine  Carpal Tunnel Release    Patient: Carol Srinivasan MRN: [id number]  SSN: [id number]    YOB: 1975  Age: 55 y.o. Sex: female      Date of Surgery: 9/26/2022   Preoperative Diagnosis: RIGHT CARPAL TUNNEL SYNDROME   Postoperative Diagnosis: RIGHT CARPAL TUNNEL SYNDROME   Location: [facility]  Surgeon: Hillary Edward MD  Assistant:  Sweta Mesa PA-C  Circ-1: Casim[facility] House; Arnett Schlatter, RN  Physician Assistant: Juli Taveras PA-C  Scrub Tech-1: Sissy García  Surg Asst-1: Metro Bolls    Anesthesia: Other     Procedure: Rt Carpal Tunnel Release    Estimated Blood Loss: Less than 25 ccs    Specimens: None    Complications: None      DESCRIPTION OF PROCEDURE: The patient was taken to the operating room, placed in the supine position on the operating room table. Satisfactory general anesthesia was established. Tourniquet was placed on the rt upper arm. The arm was prepped with ChloraPrep and draped in a sterile fashion. Attention was directed to the carpal tunnel. Tourniquet was not used. Hoppenfeld type incision was made, length of approximately 3 cm. Incision was made through the skin. Blunt dissection was continued through the subcutaneous tissue, care being taken to avoid damage to the palmar cutaneous branch of the median nerve. Dissection was continued to the transverse carpal ligament. This was incised sharply. Incision was continued using the tenotomy scissors. The entire transverse carpal ligament was sectioned. Care was taken to avoid damage to the recurrent motor branch of the median nerve. This was opened initially distally and then attention was redirected proximally to be certain the proximal fibers had been sectioned. The median nerve was visualized after initially sectioning the transverse carpal ligament, and it remained uninjured throughout the procedure. The tourniquet was deflated.  The wound was irrigated with antibiotic irrigation. Skin alone was closed using interrupted 4-0 nylon vertical mattress sutures. Wound was dressed with Xeroform, followed by 4 x 4's, Kerlix and an Ace wrap. The patient tolerated the procedure well, transferred onto the recovery room bed and taken to recovery room in stable condition.

## 2022-09-26 NOTE — INTERVAL H&P NOTE
Update History & Physical    The Patient's History and Physical of September 26, 2022 was reviewed with the patient and I examined the patient. There was no change. The surgical site was confirmed by the patient and me. Plan:  The risk, benefits, expected outcome, and alternative to the recommended procedure have been discussed with the patient. Patient understands and wants to proceed with the procedure.     Electronically signed by Meliza Jenkins MD on 9/26/2022 at 1:17 PM

## 2022-09-26 NOTE — PERIOP NOTES
TRANSFER - IN REPORT:    Verbal report received from [facility] RN(name) on Nikole Jiménez  being received from OR for routine post - op      Report consisted of patients Situation, Background, Assessment and   Recommendations(SBAR). Information from the following report(s) SBAR, OR Summary, Intake/Output, and MAR was reviewed with the receiving nurse. Opportunity for questions and clarification was provided. Assessment completed upon patients arrival to unit and care assumed.

## 2022-09-26 NOTE — ANESTHESIA PREPROCEDURE EVALUATION
Relevant Problems   No relevant active problems       Anesthetic History     PONV          Review of Systems / Medical History  Patient summary reviewed, nursing notes reviewed and pertinent labs reviewed    Pulmonary  Within defined limits            Pertinent negatives: No COPD, asthma and sleep apnea     Neuro/Psych   Within defined limits        Pertinent negatives: No seizures and CVA   Cardiovascular                Pertinent negatives: No hypertension, past MI and CHF  Exercise tolerance: >4 METS     GI/Hepatic/Renal     GERD      PUD  Pertinent negatives: No liver disease and renal disease   Endo/Other        Morbid obesity and arthritis  Pertinent negatives: No diabetes   Other Findings              Physical Exam    Airway  Mallampati: II  TM Distance: 4 - 6 cm  Neck ROM: normal range of motion   Mouth opening: Normal     Cardiovascular               Dental  No notable dental hx       Pulmonary                 Abdominal  GI exam deferred       Other Findings            Anesthetic Plan    ASA: 2  Anesthesia type: general          Induction: Intravenous  Anesthetic plan and risks discussed with: Patient

## 2022-09-26 NOTE — H&P
[address] [facility] [address] Medicine  History and Physical Exam    Patient: Michelle Palma MRN: [id number]  SSN: [id number]    YOB: 1975  Age: 55 y.o. Sex: female      Subjective:      Chief Complaint: right wrist pain    History of Present Illness:  Patient complains of right wrist/hand pain with numbness and tingling. Past Medical History:   Diagnosis Date    Adverse effect of anesthesia     causes blood pressure to go up    Anxiety     Arthritis     knees    Bronchitis     seasonal    Carpal tunnel syndrome     Chronic pain     Constipation     Edema     GERD (gastroesophageal reflux disease)     on PPI and H2 blocker, Dr Jefry Lucio at [address] pylori infection     Hx of ventral hernia repair     IBS (irritable bowel syndrome)     Migraines     Morbid obesity ([address]     Morbid obesity with BMI of 40.0-44.9, adult (HCC)     Nausea & vomiting     PUD (peptic ulcer disease)     h/o    Sleep disorder breathing     has sleep study ordered 2022     Past Surgical History:   Procedure Laterality Date    HX ACL RECONSTRUCTION      HX  SECTION      HX HERNIA REPAIR  2019    incarcerated ventral with SBO    HX HERNIA REPAIR  2020    recurrent ventral hernia repair with size 8 circumferential string mesh    HX HYSTERECTOMY  2013    HX LAP CHOLECYSTECTOMY  2014    HX OTHER SURGICAL      colonoscopy , endoscopy    HX TOTAL COLECTOMY      HX TUBAL LIGATION       Social History     Occupational History    Not on file   Tobacco Use    Smoking status: Never    Smokeless tobacco: Never   Vaping Use    Vaping Use: Never used   Substance and Sexual Activity    Alcohol use: Yes     Comment: occ weekends    Drug use: Not Currently     Types: Marijuana    Sexual activity: Yes     Partners: Male     Birth control/protection: Surgical     Prior to Admission medications    Medication Sig Start Date End Date Taking?  Authorizing Provider   omeprazole (PRILOSEC) 40 mg capsule Take 40 mg by mouth two (2) times a day. 10/6/21   Provider, Historical   famotidine (PEPCID) 40 mg tablet Take 40 mg by mouth daily. 7/12/21   Provider, Historical   albuterol (PROVENTIL HFA, VENTOLIN HFA, PROAIR HFA) 90 mcg/actuation inhaler Take 2 Puffs by inhalation as needed. 1/7/22   Provider, Historical   ondansetron hcl (ZOFRAN) 4 mg tablet Take 8 mg by mouth every eight (8) hours as needed for Nausea or Vomiting. Provider, Historical   lubiPROStone (AMITIZA) 8 mcg capsule Take 8 mcg by mouth daily as needed for Constipation. Provider, Historical   acetaminophen (TYLENOL) 325 mg tablet Take 500 mg by mouth every four (4) hours as needed for Pain. Provider, Historical       Allergies: Allergies   Allergen Reactions    Iodinated Contrast Media Anaphylaxis    Nsaids (Non-Steroidal Anti-Inflammatory Drug) Diarrhea and Nausea and Vomiting    Promethazine Itching      No pertinent family history    Review of Systems:  A comprehensive review of systems was negative except for that written in the History of Present Illness. Objective:       Physical Exam:  HEENT: Normocephalic, atraumatic  Lungs:  Clear to auscultation  Heart:   Regular rate and rhythm  Abdomen: Soft  Extremities:  Positive carpal tunnel compression, right wrist  Neurological: Grossly neurovascularly intact    Assessment:      Carpal tunnel syndrome, right wrist.    Plan:       The patient has failed previous efforts of conservative management to include bracing. Due to the fact that conservative efforts failed, the patient became a candidate for surgical intervention. Proceed with scheduled right wrist carpal tunnel release. The various methods of treatment have been discussed with the patient and family. After consideration of risks, benefits, and other options for treatment, the patient has consented to surgical interventions. Questions were answered and preoperative teaching was done by Dr. Lise Thomas.      Signed By: Pietro Fernandes PA-C September 26, 2022

## 2022-09-26 NOTE — BRIEF OP NOTE
Brief Postoperative Note    Patient: Bruce Adame  YOB: 1975  MRN: [id number]    Date of Procedure: 9/26/2022     Pre-Op Diagnosis: RIGHT CARPAL TUNNEL SYNDROME    Post-Op Diagnosis: Same as preoperative diagnosis.       Procedure(s):  RIGHT CARPAL TUNNEL RELEASE    Surgeon(s):  Marley Mcwilliams MD    Surgical Assistant: Physician Assistant: Shena Lopez PA-C  Surg Asst-1: Mary Ann Chang    Anesthesia: Other     Estimated Blood Loss (mL): Minimal    Complications: None    Specimens: * No specimens in log *     Implants: * No implants in log *    Drains: * No LDAs found *    Findings: CTS Right wrist    Electronically Signed by Wilfred Gonzalez PA-C on 9/26/2022 at 3:04 PM

## 2022-09-26 NOTE — PERIOP NOTES
TRANSFER - IN REPORT:    Verbal report received from Evelin Jimenez RN on [facility]  being received from PACU for routine post - op      Report consisted of patients Situation, Background, Assessment and   Recommendations(SBAR). Information from the following report(s) SBAR, Kardex, and MAR was reviewed with the receiving nurse. Opportunity for questions and clarification was provided. Assessment completed upon patients arrival to unit and care assumed.

## 2022-09-27 NOTE — PERIOP NOTES
TRANSFER - OUT REPORT:    Verbal report given to Tisa Lanes (name) on [facility]  being transferred to phase 2 (unit) for routine post - op       Report consisted of patients Situation, Background, Assessment and   Recommendations(SBAR). Information from the following report(s) SBAR, Procedure Summary, MAR, and Cardiac Rhythm    was reviewed with the receiving nurse. Lines:       Opportunity for questions and clarification was provided.       Patient transported with:   [employer]

## 2022-10-20 ENCOUNTER — HOSPITAL ENCOUNTER (OUTPATIENT)
Dept: BARIATRICS/WEIGHT MGMT | Age: 47
Discharge: HOME | End: 2022-10-20

## 2022-10-20 VITALS — BODY MASS INDEX: 44.48 KG/M2 | HEIGHT: 66 IN | WEIGHT: 276.8 LBS

## 2022-10-20 NOTE — PROGRESS NOTES
Medical Weight Loss Multi-Disciplinary Program    Patient's Name: Victor Manuel Claire Age: 55 y.o. YOB: 1975 Sex: female     Session #2. Pt attended in-person class. Weight obtained in office. Date: 10/20/2022    Visit Vitals  Ht 5' 5.5\" (1.664 m)   Wt 125.6 kg (276 lb 12.8 oz)   BMI 45.36 kg/m²       Pounds Lost since last month: 0 lbs Pounds Gained since last month: 5.7 lbs       Starting Weight: 274.5 lbs Previous Months Weight: 271.1 lbs   Overall Pounds Lost: 0 lbs  Overall Pounds Gained: 2.3 lbs     Note that pt has a pre-operative weight loss goal of 5-10 lbs. Pt has not met this goal.       Do you smoke? No    Alcohol intake:  Number of drinks per week: 3    Class Guidelines    Guidelines are reviewed with patient at the start of every class. 1. Patient understands that weight loss trial classes must be consecutive. Patient understands if they miss a class, it is their responsibility to contact me to reschedule class. I will reach out to patient after their first no show. 2.  Patient understands the expectations that weight maintenance/weight loss is expected during the classes. Failure to demonstrate changes may result in extension of weight loss trial, followed by returning to see the surgeon. Patient understands that they CANNOT gain any weight during the weight loss trial.  Gaining weight will result in extension of weight loss trial.  3. Patient is also instructed to complete their labwork, psychological evaluation visit, and any other tests that the surgeon has ordered while they are working on their weight loss trial.  4.  Patient was instructed to bring their packet of nutrition education materials to every class and appointment.         Eating Habits and Behaviors    Reviewed intake  Understanding low-carbohydrate/low-sugar/low-fat, higher protein meals  Instruction given for personal dietary changes  Discussed perceived compliance    Comments: RD Reviewed Diet History and Physical Activity/Exercise habits. Recommended dietary changes discussed for both before and after surgery. Today in class we reviewed the Key Diet Principles. Patient was encouraged to consume 3 meals each day, and meal timing was reviewed. Meal time behaviors that will help pt to be successful with their weight loss efforts were also discussed. We discussed the importance of drinking adequate amounts of fluids, recommending that patient consume a minimum of 64 oz of sugar-free, caffeine-free and carbonation-free fluids each day. Patient was encouraged to eliminate sugar-sweetened beverages such as sweet tea, fruit juice, fruit smoothies, flavored coffee drinks and regular sodas. During the weight loss trial, patient is encouraged to focus on eating protein-forward meals, with a daily goal of  grams protein. Patient was also advised to decrease carbohydrate intake to <100 g/d, choosing complex vs. simple carbohydrates in limited amounts. We also discussed limiting fat intake. Encouraged patient to follow the \"3-gram rule\" when choosing foods by looking for items containing <3 g of fat and sugar per serving. We reviewed meal planning guidelines and discussed appropriate meal and snack options. We also talked about appropriate protein drinks and patient was encouraged to start trying these, using them either for a meal replacement or a protein-rich snack pre-operatively. We reviewed the nutritional guidelines for selecting protein shakes. Pre-operative vitamin and mineral supplementation was reviewed. Patient was instructed to choose a chewable complete multivitamin with iron in NON-gummy form. Selection of probiotic was also reviewed. We also talked about dining out after bariatric surgery. Patient was instructed on:   Following the Key Diet Principles to stay within the bariatric dietary guidelines  Key words to look for in menu item descriptions in order to make healthier choices  Requesting specific substitutions to allow meals to fit in with bariatric dietary guidelines  Using the restaurant card to request smaller portions of menu items or ordering from children's/senior's menu    Patient's current diet habits include:  Patient is eating 2-3 meals and 1 snacks per day. Per dietary recall, pts diet has the following concerns:  Pt is skipping meals. Pt is only consuming 32 oz water or other sugar-free/caffeine-free/non-carbonated beverages daily. Pt is consuming 32+ oz sugar-sweetened beverages/day. Pt is consuming sugar sweetened foods 3d/wk. Pt is consuming foods high in carbohydrates, such as: grits, juice, breads, chips  Pt is consuming an inadequate amount of protein. Pt has found 0 protein supplement shakes for use post-op in meeting their protein needs. Patient's plan for dietary and/or behavior changes in the upcoming month: \"try to eat better'. Physical Activity/Exercise    Comments: We talked about exercise. Patient was given reasons of why exercise is so important and how that can help with their long-term success. I have encouraged patient to get a support system to help with the activity. We talked about recommended types of physical activity, including walking, swimming, cycling, or chair exercises. I also talked with patient about doing some strength training, which helps the metabolism, as well as strengthen muscles and bones. Patient's plan to incorporate more activity includes: \"add more walking everyday\". Behavior Modification     Comments:  During today's class, we discussed important behavior changes relating to dining away from home that will help the patient be successful in meeting their weight loss goals and improving their health including:   The need to limit the frequency of dining away from home, especially fast food and convenience food options, in order to limit intake of calories, fats, and carbohydrates  The importance of keeping a positive mindset by focusing on the occasion and enjoying the company rather than the foods they can't have and the need for reduced portion sizes after surgery  Avoiding starters such as appetizers, breads, bar snacks, soups, and salads  Researching to find restaurants that offer healthier food options and reviewing menus/nutrition facts online to have a plan of what to order before dining out    Goals: Work to increase to 3-4 small meals per day, with 1-2 planned snacks as needed. Recommend following the plate method for meal planning - focusing on lean protein, non-starchy vegetables, and measured amounts of complex carbohydrates. - Goal of  g protein and <100 g carbohydrates per day. - Select at least 2 DIFFERENT protein supplements that can be used for protein supplementation to meet goals pre- and post-operatively. - Practice Carbohydrate Counting and label reading.   - Follow 3 g rule for fat and sugar. 2. Slow down meals  - Chew each bite 25-35 times. - Aim for 20-30 min/meal.  3. Increase non-caloric fluids to 64 oz per day. Eliminate caffeine, added sugar, carbonation, and straws.               - Continue to work to decrease sugar sweetened beverages - goal of calorie-free beverages only. - Must eliminate caffeine prior to surgery and avoid for ~6-8 weeks. - Practice 30:30 rule,  food and fluid intake. 4. Start activity regimen, work to increase ADLs. 5. Start Complete MVI and probiotic at least 30 days pre-op. Candidate for surgery (per RD): PENDING, Pt scheduled for nutrition education on 11/15/22.

## (undated) DEVICE — KIT ACL ANT CRUCE LIG CANN RUL PEN DRL PIN GWIRE RETRCT

## (undated) DEVICE — Device

## (undated) DEVICE — (D)PREP SKN CHLRAPRP APPL 26ML -- CONVERT TO ITEM 371833

## (undated) DEVICE — ELECTRODE RF DIA4MM 90DEG SUCT W/ INTEGR HNDPC VAPR S90

## (undated) DEVICE — GOWN,SIRUS,NONRNF,SETINSLV,2XL,18/CS: Brand: MEDLINE

## (undated) DEVICE — SUTURE ETHLN SZ 4-0 L18IN NONABSORBABLE BLK L19MM PS-2 3/8 1667H

## (undated) DEVICE — 3.5 MM FULL RADIUS ELITE STRAIGHT                                    DISPOSABLE BLADES, BEIGE,PACKAGED 6                                    PER BOX, STERILE

## (undated) DEVICE — REM POLYHESIVE ADULT PATIENT RETURN ELECTRODE: Brand: VALLEYLAB

## (undated) DEVICE — GAUZE,SPONGE,8"X4",12PLY,XRAY,STRL,LF: Brand: MEDLINE

## (undated) DEVICE — GLOVE SURG SZ 65 L12IN FNGR THK79MIL GRN LTX FREE

## (undated) DEVICE — BANDAGE,GAUZE,BULKEE II,4.5"X4.1YD,STRL: Brand: MEDLINE

## (undated) DEVICE — PACK PROCEDURE SURG EXTREMITY CUST

## (undated) DEVICE — SOLUTION IRRIG 3000ML LAC R FLX CONT

## (undated) DEVICE — IMMOBILIZER KNEE UNIV L19IN FOR 12-24IN THGH FOAM T BAR

## (undated) DEVICE — ZIMMER® STERILE DISPOSABLE TOURNIQUET CUFF WITH PROTECTIVE SLEEVE AND PLC, SINGLE PORT, SINGLE BLADDER, 34 IN. (86 CM)

## (undated) DEVICE — GARMENT,MEDLINE,DVT,INT,CALF,MED, GEN2: Brand: MEDLINE

## (undated) DEVICE — SUTURE FIBERWIRE SZ 2 W/ TAPERED NEEDLE BLUE L38IN NONABSORB BLU L26.5MM 1/2 CIRCLE AR7200

## (undated) DEVICE — UNDERCAST PADDING: Brand: DEROYAL

## (undated) DEVICE — GLOVE ORANGE PI 8 1/2   MSG9085

## (undated) DEVICE — GLOVE SURG SZ 65 THK91MIL LTX FREE SYN POLYISOPRENE

## (undated) DEVICE — PREP SKN CHLRAPRP APL 26ML STR --

## (undated) DEVICE — PAD,ABDOMINAL,5"X9",ST,LF,25/BX: Brand: MEDLINE INDUSTRIES, INC.

## (undated) DEVICE — TUBE IRRIG L8IN LNG PT W/ CONN FOR PMP SYS REDEUCE

## (undated) DEVICE — TUBING PMP L8FT LNG W/ CONN FOR AR-6400 REDEUCE

## (undated) DEVICE — LEGGINGS, PAIR, 31X48, STERILE: Brand: MEDLINE

## (undated) DEVICE — DYONICS 5.5 FULL RADIUS BONECUTTER                                    BLADES, ORANGE, 8000 MAXIMUM RPM,                                    PACKAGED 6 PER BOX, STERILE

## (undated) DEVICE — BANDAGE COMPR W3INXL5YD BGE POLY COT E RECOVERABLE BRTH W/

## (undated) DEVICE — CURITY NON-ADHERENT STRIPS: Brand: CURITY

## (undated) DEVICE — SUTURE MCRYL SZ 2-0 L36IN ABSRB UD L36MM CT-1 1/2 CIR Y945H

## (undated) DEVICE — PACK PROCEDURE SURG KNEE ARTHSCP CUST

## (undated) DEVICE — SOL IRRIGATION INJ NACL 0.9% 500ML BTL

## (undated) DEVICE — SUT MONOCRYL PLUS UD 3-0 --